# Patient Record
Sex: MALE | Race: OTHER | Employment: STUDENT | URBAN - METROPOLITAN AREA
[De-identification: names, ages, dates, MRNs, and addresses within clinical notes are randomized per-mention and may not be internally consistent; named-entity substitution may affect disease eponyms.]

---

## 2024-05-03 ENCOUNTER — HOSPITAL ENCOUNTER (INPATIENT)
Facility: HOSPITAL | Age: 24
LOS: 1 days | Discharge: HOME | DRG: 866 | End: 2024-05-05
Attending: EMERGENCY MEDICINE | Admitting: INTERNAL MEDICINE
Payer: COMMERCIAL

## 2024-05-03 DIAGNOSIS — B27.90 MONONUCLEOSIS SYNDROME: ICD-10-CM

## 2024-05-03 DIAGNOSIS — E80.6 HYPERBILIRUBINEMIA: ICD-10-CM

## 2024-05-03 DIAGNOSIS — R21 RASH: ICD-10-CM

## 2024-05-03 DIAGNOSIS — R74.01 TRANSAMINITIS: Primary | ICD-10-CM

## 2024-05-03 DIAGNOSIS — R16.0 LIVER MASS: ICD-10-CM

## 2024-05-03 PROBLEM — R65.10 SIRS (SYSTEMIC INFLAMMATORY RESPONSE SYNDROME) (HCC): Status: ACTIVE | Noted: 2024-05-03

## 2024-05-03 PROBLEM — R79.89 ELEVATED LFTS: Status: ACTIVE | Noted: 2024-05-03

## 2024-05-03 LAB
AFP-TM SERPL-MCNC: 2.02 NG/ML (ref 0–9)
ALBUMIN SERPL BCP-MCNC: 3.9 G/DL (ref 3.5–5)
ALP SERPL-CCNC: 476 U/L (ref 34–104)
ALT SERPL W P-5'-P-CCNC: 389 U/L (ref 7–52)
ANION GAP SERPL CALCULATED.3IONS-SCNC: 7 MMOL/L (ref 4–13)
APTT PPP: 34 SECONDS (ref 23–37)
AST SERPL W P-5'-P-CCNC: 218 U/L (ref 13–39)
BASOPHILS # BLD MANUAL: 0 THOUSAND/UL (ref 0–0.1)
BASOPHILS NFR MAR MANUAL: 0 % (ref 0–1)
BILIRUB SERPL-MCNC: 4.09 MG/DL (ref 0.2–1)
BUN SERPL-MCNC: 14 MG/DL (ref 5–25)
CALCIUM SERPL-MCNC: 9.1 MG/DL (ref 8.4–10.2)
CHLORIDE SERPL-SCNC: 98 MMOL/L (ref 96–108)
CO2 SERPL-SCNC: 28 MMOL/L (ref 21–32)
CREAT SERPL-MCNC: 0.84 MG/DL (ref 0.6–1.3)
EOSINOPHIL # BLD MANUAL: 0.25 THOUSAND/UL (ref 0–0.4)
EOSINOPHIL NFR BLD MANUAL: 2 % (ref 0–6)
ERYTHROCYTE [DISTWIDTH] IN BLOOD BY AUTOMATED COUNT: 14.9 % (ref 11.6–15.1)
GFR SERPL CREATININE-BSD FRML MDRD: 125 ML/MIN/1.73SQ M
GLUCOSE SERPL-MCNC: 87 MG/DL (ref 65–140)
HCT VFR BLD AUTO: 46.8 % (ref 36.5–49.3)
HGB BLD-MCNC: 15.5 G/DL (ref 12–17)
INR PPP: 1.06 (ref 0.84–1.19)
LYMPHOCYTES # BLD AUTO: 71 % (ref 14–44)
LYMPHOCYTES # BLD AUTO: 9.53 THOUSAND/UL (ref 0.6–4.47)
MCH RBC QN AUTO: 28.6 PG (ref 26.8–34.3)
MCHC RBC AUTO-ENTMCNC: 33.1 G/DL (ref 31.4–37.4)
MCV RBC AUTO: 86 FL (ref 82–98)
MONOCYTES # BLD AUTO: 0.51 THOUSAND/UL (ref 0–1.22)
MONOCYTES NFR BLD: 4 % (ref 4–12)
NEUTROPHILS # BLD MANUAL: 2.41 THOUSAND/UL (ref 1.85–7.62)
NEUTS SEG NFR BLD AUTO: 19 % (ref 43–75)
PLATELET # BLD AUTO: 211 THOUSANDS/UL (ref 149–390)
PLATELET BLD QL SMEAR: ADEQUATE
PMV BLD AUTO: 10.7 FL (ref 8.9–12.7)
POTASSIUM SERPL-SCNC: 4.3 MMOL/L (ref 3.5–5.3)
PROT SERPL-MCNC: 8 G/DL (ref 6.4–8.4)
PROTHROMBIN TIME: 14 SECONDS (ref 11.6–14.5)
RBC # BLD AUTO: 5.42 MILLION/UL (ref 3.88–5.62)
RBC MORPH BLD: NORMAL
SODIUM SERPL-SCNC: 133 MMOL/L (ref 135–147)
VARIANT LYMPHS # BLD AUTO: 4 %
WBC # BLD AUTO: 12.7 THOUSAND/UL (ref 4.31–10.16)

## 2024-05-03 PROCEDURE — 87340 HEPATITIS B SURFACE AG IA: CPT | Performed by: STUDENT IN AN ORGANIZED HEALTH CARE EDUCATION/TRAINING PROGRAM

## 2024-05-03 PROCEDURE — 87040 BLOOD CULTURE FOR BACTERIA: CPT | Performed by: STUDENT IN AN ORGANIZED HEALTH CARE EDUCATION/TRAINING PROGRAM

## 2024-05-03 PROCEDURE — 86665 EPSTEIN-BARR CAPSID VCA: CPT | Performed by: EMERGENCY MEDICINE

## 2024-05-03 PROCEDURE — 85610 PROTHROMBIN TIME: CPT | Performed by: EMERGENCY MEDICINE

## 2024-05-03 PROCEDURE — 99223 1ST HOSP IP/OBS HIGH 75: CPT | Performed by: STUDENT IN AN ORGANIZED HEALTH CARE EDUCATION/TRAINING PROGRAM

## 2024-05-03 PROCEDURE — 82378 CARCINOEMBRYONIC ANTIGEN: CPT | Performed by: STUDENT IN AN ORGANIZED HEALTH CARE EDUCATION/TRAINING PROGRAM

## 2024-05-03 PROCEDURE — 86704 HEP B CORE ANTIBODY TOTAL: CPT | Performed by: STUDENT IN AN ORGANIZED HEALTH CARE EDUCATION/TRAINING PROGRAM

## 2024-05-03 PROCEDURE — 85027 COMPLETE CBC AUTOMATED: CPT | Performed by: EMERGENCY MEDICINE

## 2024-05-03 PROCEDURE — 85730 THROMBOPLASTIN TIME PARTIAL: CPT | Performed by: EMERGENCY MEDICINE

## 2024-05-03 PROCEDURE — 80074 ACUTE HEPATITIS PANEL: CPT | Performed by: EMERGENCY MEDICINE

## 2024-05-03 PROCEDURE — 85007 BL SMEAR W/DIFF WBC COUNT: CPT | Performed by: EMERGENCY MEDICINE

## 2024-05-03 PROCEDURE — 82105 ALPHA-FETOPROTEIN SERUM: CPT | Performed by: STUDENT IN AN ORGANIZED HEALTH CARE EDUCATION/TRAINING PROGRAM

## 2024-05-03 PROCEDURE — 86664 EPSTEIN-BARR NUCLEAR ANTIGEN: CPT | Performed by: EMERGENCY MEDICINE

## 2024-05-03 PROCEDURE — 86704 HEP B CORE ANTIBODY TOTAL: CPT | Performed by: EMERGENCY MEDICINE

## 2024-05-03 PROCEDURE — 96374 THER/PROPH/DIAG INJ IV PUSH: CPT

## 2024-05-03 PROCEDURE — 86663 EPSTEIN-BARR ANTIBODY: CPT | Performed by: EMERGENCY MEDICINE

## 2024-05-03 PROCEDURE — 80053 COMPREHEN METABOLIC PANEL: CPT | Performed by: EMERGENCY MEDICINE

## 2024-05-03 PROCEDURE — 99285 EMERGENCY DEPT VISIT HI MDM: CPT | Performed by: EMERGENCY MEDICINE

## 2024-05-03 PROCEDURE — 86705 HEP B CORE ANTIBODY IGM: CPT | Performed by: STUDENT IN AN ORGANIZED HEALTH CARE EDUCATION/TRAINING PROGRAM

## 2024-05-03 PROCEDURE — 86803 HEPATITIS C AB TEST: CPT | Performed by: STUDENT IN AN ORGANIZED HEALTH CARE EDUCATION/TRAINING PROGRAM

## 2024-05-03 PROCEDURE — 99283 EMERGENCY DEPT VISIT LOW MDM: CPT

## 2024-05-03 PROCEDURE — 36415 COLL VENOUS BLD VENIPUNCTURE: CPT | Performed by: EMERGENCY MEDICINE

## 2024-05-03 RX ORDER — DIPHENHYDRAMINE HYDROCHLORIDE, ZINC ACETATE 2; .1 G/100G; G/100G
CREAM TOPICAL 3 TIMES DAILY PRN
Status: DISCONTINUED | OUTPATIENT
Start: 2024-05-03 | End: 2024-05-04

## 2024-05-03 RX ORDER — DIPHENHYDRAMINE HYDROCHLORIDE 50 MG/ML
25 INJECTION INTRAMUSCULAR; INTRAVENOUS ONCE
Status: COMPLETED | OUTPATIENT
Start: 2024-05-03 | End: 2024-05-03

## 2024-05-03 RX ORDER — DIPHENHYDRAMINE HCL 25 MG
25 TABLET ORAL EVERY 6 HOURS PRN
Status: DISCONTINUED | OUTPATIENT
Start: 2024-05-03 | End: 2024-05-04

## 2024-05-03 RX ORDER — ONDANSETRON 2 MG/ML
4 INJECTION INTRAMUSCULAR; INTRAVENOUS EVERY 6 HOURS PRN
Status: DISCONTINUED | OUTPATIENT
Start: 2024-05-03 | End: 2024-05-05 | Stop reason: HOSPADM

## 2024-05-03 RX ORDER — NAPROXEN 250 MG/1
250 TABLET ORAL 3 TIMES DAILY PRN
Status: DISCONTINUED | OUTPATIENT
Start: 2024-05-03 | End: 2024-05-04

## 2024-05-03 RX ADMIN — DIPHENHYDRAMINE HYDROCHLORIDE 25 MG: 25 TABLET ORAL at 21:22

## 2024-05-03 RX ADMIN — NAPROXEN 250 MG: 250 TABLET ORAL at 21:20

## 2024-05-03 RX ADMIN — DIPHENHYDRAMINE HYDROCHLORIDE 25 MG: 50 INJECTION, SOLUTION INTRAMUSCULAR; INTRAVENOUS at 16:33

## 2024-05-03 RX ADMIN — DIPHENHYDRAMINE HYDROCHLORIDE, ZINC ACETATE: 2; .1 CREAM TOPICAL at 20:34

## 2024-05-03 NOTE — ED PROVIDER NOTES
History  Chief Complaint   Patient presents with    Rash     Patient reports was seen at [facility] recently and diagnosed with Mono, while there they found a mass on his liver. Patient reports this morning he noticed a rash on his right shoulder that is now covering his entire body. Denies any new detergents, soaps, foods, or exposures. Denies SOB.     22 yo M referred to ED from , where he was being seen in followup for transaminitis, hyperbilirubinemia, first noted [facility] ED 4/30/24 where he was being evaluated for 2 weeks of fatigue, sore throat, intermittent fever, onset of jaundice diagnosed with mononucleosis.  He was found to have a 4.6 cm solid mass in the left lobe of the liver for which the differential includes benign lesions such as well as malignancy, and for which liver mass protocol MRI for further evaluation was recommended.  Since then, jaundice has dissipated somewhat, sore throat is waxing/waning, and then today he developed a diffuse papular rash from head to feet including palms and soles.  He was referred to the ED with the goal to admit him for further evaluation of the liver.  He did not receive any amoxicillin.      Lyme, Strep, COVID, Flu, RSV, Hepatitis panel were negative at the previous hospital.        History provided by:  Patient      Prior to Admission Medications   Prescriptions Last Dose Informant Patient Reported? Taking?   Acetaminophen (TYLENOL PO)   Yes Yes   Sig: Take by mouth   Ibuprofen (MOTRIN PO)   Yes Yes   Sig: Take by mouth      Facility-Administered Medications: None       History reviewed. No pertinent past medical history.    History reviewed. No pertinent surgical history.    Family History   Problem Relation Age of Onset    Thyroid disease Mother     Migraines Mother     Cancer Father         Bladder Cancer    Colon polyps Father     Lung cancer Maternal Grandmother     Lung cancer Paternal Grandmother     Brain cancer Maternal Aunt      I have reviewed and agree with  the history as documented.    E-Cigarette/Vaping    E-Cigarette Use Never User      E-Cigarette/Vaping Substances    Nicotine No     THC No     CBD No     Flavoring No     Other No     Unknown No      Social History     Tobacco Use    Smoking status: Never    Smokeless tobacco: Never   Vaping Use    Vaping status: Never Used   Substance Use Topics    Alcohol use: Yes     Alcohol/week: 1.0 - 2.0 standard drink of alcohol     Types: 1 - 2 Standard drinks or equivalent per week    Drug use: Never       Review of Systems    Physical Exam  Physical Exam  Vitals and nursing note reviewed.   Constitutional:       Appearance: He is well-developed.   HENT:      Head: Normocephalic and atraumatic.      Right Ear: External ear normal.      Left Ear: External ear normal.      Nose: Nose normal.      Mouth/Throat:      Mouth: Mucous membranes are moist.      Pharynx: Oropharyngeal exudate present.      Tonsils: Tonsillar exudate present.   Eyes:      Conjunctiva/sclera: Conjunctivae normal.      Pupils: Pupils are equal, round, and reactive to light.   Cardiovascular:      Rate and Rhythm: Normal rate.   Pulmonary:      Effort: Pulmonary effort is normal.   Abdominal:      Tenderness: There is no abdominal tenderness.   Musculoskeletal:         General: Normal range of motion.      Cervical back: Normal range of motion and neck supple.   Skin:     General: Skin is warm and dry.      Capillary Refill: Capillary refill takes less than 2 seconds.      Coloration: Skin is jaundiced (slight scleral icterus).      Findings: Rash (diffuse morbilliform, including palms and soles) present.   Neurological:      Mental Status: He is alert and oriented to person, place, and time.      Cranial Nerves: No cranial nerve deficit.      Coordination: Coordination normal.   Psychiatric:         Behavior: Behavior normal.         Thought Content: Thought content normal.         Judgment: Judgment normal.         Vital Signs  ED Triage Vitals  [05/03/24 1517]   Temperature Pulse Respirations Blood Pressure SpO2   99.1 °F (37.3 °C) 88 16 142/79 98 %      Temp Source Heart Rate Source Patient Position - Orthostatic VS BP Location FiO2 (%)   Oral Monitor Sitting Right arm --      Pain Score       No Pain           Vitals:    05/03/24 1517 05/03/24 1726   BP: 142/79 117/63   Pulse: 88 86   Patient Position - Orthostatic VS: Sitting Lying         Visual Acuity      ED Medications  Medications   diphenhydrAMINE (BENADRYL) injection 25 mg (25 mg Intravenous Given 5/3/24 1633)       Diagnostic Studies  Results Reviewed       Procedure Component Value Units Date/Time    Manual Differential(PHLEBS Do Not Order) [[id number]]  (Abnormal) Collected: 05/03/24 1622    Lab Status: Final result Specimen: Blood from Arm, Left Updated: 05/03/24 1711     Segmented % 19 %      Lymphocytes % 71 %      Monocytes % 4 %      Eosinophils % 2 %      Basophils % 0 %      Atypical Lymphocytes % 4 %      Absolute Neutrophils 2.41 Thousand/uL      Absolute Lymphocytes 9.53 Thousand/uL      Absolute Monocytes 0.51 Thousand/uL      Absolute Eosinophils 0.25 Thousand/uL      Absolute Basophils 0.00 Thousand/uL      Total Counted --     RBC Morphology Normal     Platelet Estimate Adequate    Comprehensive metabolic panel [[id number]]  (Abnormal) Collected: 05/03/24 1622    Lab Status: Final result Specimen: Blood from Arm, Left Updated: 05/03/24 1648     Sodium 133 mmol/L      Potassium 4.3 mmol/L      Chloride 98 mmol/L      CO2 28 mmol/L      ANION GAP 7 mmol/L      BUN 14 mg/dL      Creatinine 0.84 mg/dL      Glucose 87 mg/dL      Calcium 9.1 mg/dL       U/L       U/L      Alkaline Phosphatase 476 U/L      Total Protein 8.0 g/dL      Albumin 3.9 g/dL      Total Bilirubin 4.09 mg/dL      eGFR 125 ml/min/1.73sq m     Narrative:      National Kidney Disease Foundation guidelines for Chronic Kidney Disease (CKD):     Stage 1 with normal or high GFR (GFR > 90 mL/min/1.73 square  meters)    Stage 2 Mild CKD (GFR = 60-89 mL/min/1.73 square meters)    Stage 3A Moderate CKD (GFR = 45-59 mL/min/1.73 square meters)    Stage 3B Moderate CKD (GFR = 30-44 mL/min/1.73 square meters)    Stage 4 Severe CKD (GFR = 15-29 mL/min/1.73 square meters)    Stage 5 End Stage CKD (GFR <15 mL/min/1.73 square meters)  Note: GFR calculation is accurate only with a steady state creatinine    Protime-INR [[id number]]  (Normal) Collected: 05/03/24 1622    Lab Status: Final result Specimen: Blood from Arm, Left Updated: 05/03/24 1647     Protime 14.0 seconds      INR 1.06    APTT [[id number]]  (Normal) Collected: 05/03/24 1622    Lab Status: Final result Specimen: Blood from Arm, Left Updated: 05/03/24 1647     PTT 34 seconds     CBC and differential [[id number]]  (Abnormal) Collected: 05/03/24 1622    Lab Status: Final result Specimen: Blood from Arm, Left Updated: 05/03/24 1632     WBC 12.70 Thousand/uL      RBC 5.42 Million/uL      Hemoglobin 15.5 g/dL      Hematocrit 46.8 %      MCV 86 fL      MCH 28.6 pg      MCHC 33.1 g/dL      RDW 14.9 %      MPV 10.7 fL      Platelets 211 Thousands/uL     Narrative:      This is an appended report.  These results have been appended to a previously verified report.    EBV acute panel [[id number]] Collected: 05/03/24 1622    Lab Status: In process Specimen: Blood from Arm, Left Updated: 05/03/24 1626    Hepatitis panel, acute [[id number]] Collected: 05/03/24 1622    Lab Status: In process Specimen: Blood from Arm, Left Updated: 05/03/24 1625                   No orders to display              Procedures  Procedures         ED Course  ED Course as of 05/03/24 1738   Fri May 03, 2024   1632 WBC(!): 12.70  Mildly elevated, stable compared to previous   1632 Symptoms and sequelae consistent with diagnosis of EBV.  I reviewed the notes from GI office today, and received a forwarded message from Venita Deng PA-C to Dr. Massey to our charge nurse stating they want the patient admitted for  further evaluation.  I ordered of the labwork relevant to the ED, including hepatitis panel, and EBV.     1649 POCT INR: 1.06  normal   1650 Total Bilirubin(!): 4.09  Improved from 4/30   1650 Transaminitis stable to improved   1716 With improved LFTs, I spoke with Dr. Massey came to ED and I reviewed the case.  He agreed the values are improving.  I asked for consultation, and he said to admit him to observation on SLIM overnight and consult can be done tomorrow.                                               Medical Decision Making  Amount and/or Complexity of Data Reviewed  Labs: ordered. Decision-making details documented in ED Course.    Risk  Prescription drug management.  Decision regarding hospitalization.             Disposition  Final diagnoses:   Transaminitis   Hyperbilirubinemia   Mononucleosis syndrome   Liver mass     Time reflects when diagnosis was documented in both MDM as applicable and the Disposition within this note       Time User Action Codes Description Comment    5/3/2024  5:19 PM Leander Sanders [R74.01] Transaminitis     5/3/2024  5:20 PM Leander Sanders [E80.6] Hyperbilirubinemia     5/3/2024  5:20 PM Leander Sanders Add [B27.90] Mononucleosis syndrome     5/3/2024  5:36 PM Leander Sanders Add [R16.0] Liver mass           ED Disposition       ED Disposition   Admit    Condition   Stable    Date/Time   Fri May 3, 2024  5:35 PM    Comment   Case was discussed with Dr. Dillon and the patient's admission status was agreed to be Admission Status: observation status to the service of Dr. Dillon .               Follow-up Information    None         Patient's Medications   Discharge Prescriptions    No medications on file       No discharge procedures on file.    PDMP Review       None            ED Provider  Electronically Signed by             Leander Sanders MD  05/03/24 1738       Leander Sanders MD  05/03/24 1739

## 2024-05-03 NOTE — ASSESSMENT & PLAN NOTE
Diffused macular papular rash involving neck, hands, chest, back, abdomen and legs  Unclear of current etiology at this time  Possibly related to recent infectious mononucleosis, no recent antibiotics usage  Supportive care

## 2024-05-03 NOTE — ASSESSMENT & PLAN NOTE
Did not meet SIRS criteria with tachycardia, white count  Empirically check blood cultures  Suspect likely due to recently diagnosed infectious mononucleosis  ID consult

## 2024-05-03 NOTE — PLAN OF CARE
Problem: PAIN - ADULT  Goal: Verbalizes/displays adequate comfort level or baseline comfort level  Description: Interventions:  - Encourage patient to monitor pain and request assistance  - Assess pain using appropriate pain scale  - Administer analgesics based on type and severity of pain and evaluate response  - Implement non-pharmacological measures as appropriate and evaluate response  - Consider cultural and social influences on pain and pain management  - Notify physician/advanced practitioner if interventions unsuccessful or patient reports new pain  Outcome: Progressing     Problem: INFECTION - ADULT  Goal: Absence or prevention of progression during hospitalization  Description: INTERVENTIONS:  - Assess and monitor for signs and symptoms of infection  - Monitor lab/diagnostic results  - Monitor all insertion sites, i.e. indwelling lines, tubes, and drains  - Monitor endotracheal if appropriate and nasal secretions for changes in amount and color  - [place] appropriate cooling/warming therapies per order  - Administer medications as ordered  - Instruct and encourage patient and family to use good hand hygiene technique  - Identify and instruct in appropriate isolation precautions for identified infection/condition  Outcome: Progressing  Goal: Absence of fever/infection during neutropenic period  Description: INTERVENTIONS:  - Monitor WBC    Outcome: Progressing     Problem: SAFETY ADULT  Goal: Patient will remain free of falls  Description: INTERVENTIONS:  - Educate patient/family on patient safety including physical limitations  - Instruct patient to call for assistance with activity   - Consult OT/PT to assist with strengthening/mobility   - Keep Call bell within reach  - Keep bed low and locked with side rails adjusted as appropriate  - Keep care items and personal belongings within reach  - Initiate and maintain comfort rounds  - Make Fall Risk Sign visible to staff  - Obtain necessary fall risk  management equipment  - Apply yellow socks and bracelet for high fall risk patients  - Consider moving patient to room near nurses station  Outcome: Progressing  Goal: Maintain or return to baseline ADL function  Description: INTERVENTIONS:  -  Assess patient's ability to carry out ADLs; assess patient's baseline for ADL function and identify physical deficits which impact ability to perform ADLs (bathing, care of mouth/teeth, toileting, grooming, dressing, etc.)  - Assess/evaluate cause of self-care deficits   - Assess range of motion  - Assess patient's mobility; develop plan if impaired  - Assess patient's need for assistive devices and provide as appropriate  - Encourage maximum independence but intervene and supervise when necessary  - Involve family in performance of ADLs  - Assess for home care needs following discharge   - Consider OT consult to assist with ADL evaluation and planning for discharge  - Provide patient education as appropriate  Outcome: Progressing  Goal: Maintains/Returns to pre admission functional level  Description: INTERVENTIONS:  - Perform AM-PAC 6 Click Basic Mobility/ Daily Activity assessment daily.  - Set and communicate daily mobility goal to care team and patient/family/caregiver.   - Collaborate with rehabilitation services on mobility goals if consulted  - Perform Range of Motion 3 times a day.  - Reposition patient every 2 hours.  - Dangle patient 3 times a day  - Stand patient 3 times a day  - Ambulate patient 3 times a day  - Out of bed to chair 3 times a day   - Out of bed for meals 3 times a day  - Out of bed for toileting  - Record patient progress and toleration of activity level   Outcome: Progressing     Problem: DISCHARGE PLANNING  Goal: Discharge to home or other facility with appropriate resources  Description: INTERVENTIONS:  - Identify barriers to discharge w/patient and caregiver  - Arrange for needed discharge resources and transportation as appropriate  -  Identify discharge learning needs (meds, wound care, etc.)  - Arrange for interpretive services to assist at discharge as needed  - Refer to Case Management Department for coordinating discharge planning if the patient needs post-hospital services based on physician/advanced practitioner order or complex needs related to functional status, cognitive ability, or social support system  Outcome: Progressing     Problem: Knowledge Deficit  Goal: Patient/family/caregiver demonstrates understanding of disease process, treatment plan, medications, and discharge instructions  Description: Complete learning assessment and assess knowledge base.  Interventions:  - Provide teaching at level of understanding  - Provide teaching via preferred learning methods  Outcome: Progressing     Problem: METABOLIC, FLUID AND ELECTROLYTES - ADULT  Goal: Electrolytes maintained within normal limits  Description: INTERVENTIONS:  - Monitor labs and assess patient for signs and symptoms of electrolyte imbalances  - Administer electrolyte replacement as ordered  - Monitor response to electrolyte replacements, including repeat lab results as appropriate  - Instruct patient on fluid and nutrition as appropriate  Outcome: Progressing  Goal: Fluid balance maintained  Description: INTERVENTIONS:  - Monitor labs   - Monitor I/O and WT  - Instruct patient on fluid and nutrition as appropriate  - Assess for signs & symptoms of volume excess or deficit  Outcome: Progressing     Problem: SKIN/TISSUE INTEGRITY - ADULT  Goal: Skin Integrity remains intact(Skin Breakdown Prevention)  Description: Assess:  -Perform Oracio assessment  -Clean and moisturize skin  -Inspect skin when repositioning, toileting, and assisting with ADLS  -Assess under medical devices  -Assess extremities for adequate circulation and sensation     Bed Management:  -Have minimal linens on bed & keep smooth, unwrinkled  -Change linens as needed when moist or perspiring  -Avoid sitting  or lying in one position for more than 2 hours while in bed  -Keep HOB at 30 degrees     Toileting:  -Offer bedside commode  -Assess for incontinence  -Use incontinent care products after each incontinent episode    Activity:  -Mobilize patient 3 times a day  -Encourage activity and walks on unit  -Encourage or provide ROM exercises   -Turn and reposition patient every 2 Hours  -Use appropriate equipment to lift or move patient in bed  -Instruct/ Assist with weight shifting when out of bed in chair  -Consider limitation of chair time 2 hour intervals    Skin Care:  -Avoid use of baby powder, tape, friction and shearing, hot water or constrictive clothing  -Relieve pressure over bony prominences  -Do not massage red bony areas    Next Steps:  -Teach patient strategies to minimize risks   -Consider consults to  interdisciplinary teams  Outcome: Progressing

## 2024-05-03 NOTE — H&P
[facility]  H&P  Name: Jhonatan Whitney 23 y.o. male I MRN: [id number]  Unit/Bed#: [address] 204[address]01 I Date of Admission: 5/3/2024   Date of Service: 5/3/2024 I Hospital Day: 0      Assessment/Plan   SIRS (systemic inflammatory response syndrome) (HCC)  Assessment & Plan  Did not meet SIRS criteria with tachycardia, white count  Empirically check blood cultures  Suspect likely due to recently diagnosed infectious mononucleosis  ID consult    Rash  Assessment & Plan  Diffused macular papular rash involving neck, hands, chest, back, abdomen and legs  Unclear of current etiology at this time  Possibly related to recent infectious mononucleosis, no recent antibiotics usage  Supportive care    Elevated LFTs  Assessment & Plan  Elevated AST, ALT and bilirubin.  Slightly decreased from [facility] when last checked on 04/30  Right upper quadrant ultrasound completed on 04/30 without any gallbladder abnormality, possibly related to liver mass versus infectious mononucleosis  Monitor at this time, GI consult to follow  Check hepatitis panel, AFP    * Liver mass, left lobe  Assessment & Plan  Left liver solid mass measuring 4.6 cm  GI outpatient did recommend MRI imaging  Will order MRI while patient is here           VTE Prophylaxis:  none, low risk   / sequential compression device   Code Status: FC  POLST: There is no POLST form on file for this patient (pre-hospital)    Anticipated Length of Stay:  Patient will be admitted on an Observation basis with an anticipated length of stay of  2 midnights.   Justification for Hospital Stay: GI evaluation    Chief Complaint:   Rash    History of Present Illness:    Jhonatan Whitney is a 23 y.o. male who presents with diffuse pruritic rash.  Patient with no past medical history.  Recently had been having increasing fatigue, weakness and jaundice.  He was evaluated at [facility] ED on April 4 where he was diagnosed with mono.  He had elevated  LFTs including bilirubin where the right upper quadrant ultrasound showed 4.6 cm liver mass and he was to follow-up with GI outpatient.  GI did recommend MRI imaging though patient has yet to obtain.  Today he awoken with diffuse pruritic rash involving his hands, legs, chest abdomen, neck hands and soles.  He denies any fevers but does occasionally have night sweats, chills.  Denies any chest pain, shortness breath, nausea or vomiting. No recent abx use.    Review of Systems:    Review of Systems   Constitutional:  Positive for chills.   Skin:  Positive for rash.   All other systems reviewed and are negative.      Past Medical and Surgical History:     History reviewed. No pertinent past medical history.    History reviewed. No pertinent surgical history.    Meds/Allergies:    Prior to Admission medications    Medication Sig Start Date End Date Taking? Authorizing Provider   Acetaminophen (TYLENOL PO) Take by mouth   Yes Historical Provider, MD   Ibuprofen (MOTRIN PO) Take by mouth   Yes Historical Provider, MD     I have reviewed home medications with patient personally.    Allergies: No Known Allergies    Social History:     Marital Status: Single   Occupation: student  Patient Pre-hospital Living Situation: home  Patient Pre-hospital Level of Mobility: ambu  Patient Pre-hospital Diet Restrictions: none  Substance Use History:   Social History     Substance and Sexual Activity   Alcohol Use Yes    Alcohol/week: 1.0 - 2.0 standard drink of alcohol    Types: 1 - 2 Standard drinks or equivalent per week     Social History     Tobacco Use   Smoking Status Never   Smokeless Tobacco Never     Social History     Substance and Sexual Activity   Drug Use Never       Family History:    Family History   Problem Relation Age of Onset    Thyroid disease Mother     Migraines Mother     Cancer Father         Bladder Cancer    Colon polyps Father     Lung cancer Maternal Grandmother     Lung cancer Paternal Grandmother     Brain  cancer Maternal Aunt        Physical Exam:     Vitals:   Blood Pressure: 116/77 (05/03/24 1822)  Pulse: 96 (05/03/24 1822)  Temperature: 99.6 °F (37.6 °C) (05/03/24 1822)  Temp Source: Oral (05/03/24 1517)  Respirations: 17 (05/03/24 1822)  Weight - Scale: 82.6 kg (182 lb 1.6 oz) (05/03/24 1517)  SpO2: 97 % (05/03/24 1822)    Physical Exam  Vitals and nursing note reviewed.   Constitutional:       Appearance: Normal appearance.   HENT:      Head: Normocephalic.   Eyes:      Conjunctiva/sclera: Conjunctivae normal.   Cardiovascular:      Rate and Rhythm: Normal rate.   Pulmonary:      Effort: Pulmonary effort is normal. No respiratory distress.   Abdominal:      General: There is no distension.      Palpations: Abdomen is soft.   Musculoskeletal:         General: No swelling. Normal range of motion.      Right lower leg: No edema.      Left lower leg: No edema.   Skin:     General: Skin is warm and dry.      Comments: Diffuse macular papular rash   Neurological:      General: No focal deficit present.      Mental Status: He is alert. Mental status is at baseline.         Additional Data:     Lab Results: I have personally reviewed pertinent reports.      Results from last 7 days   Lab Units 05/03/24  1622   WBC Thousand/uL 12.70*   HEMOGLOBIN g/dL 15.5   HEMATOCRIT % 46.8   PLATELETS Thousands/uL 211   LYMPHO PCT % 71*   MONO PCT % 4   EOS PCT % 2     Results from last 7 days   Lab Units 05/03/24  1622   SODIUM mmol/L 133*   POTASSIUM mmol/L 4.3   CHLORIDE mmol/L 98   CO2 mmol/L 28   BUN mg/dL 14   CREATININE mg/dL 0.84   ANION GAP mmol/L 7   CALCIUM mg/dL 9.1   ALBUMIN g/dL 3.9   TOTAL BILIRUBIN mg/dL 4.09*   ALK PHOS U/L 476*   ALT U/L 389*   AST U/L 218*   GLUCOSE RANDOM mg/dL 87     Results from last 7 days   Lab Units 05/03/24  1622   INR  1.06                   Imaging: I have personally reviewed pertinent reports.      MRI inpatient order    (Results Pending)       EKG, Pathology, and Other Studies Reviewed on  Admission:   EKG: none    Allscripts / Epic Records Reviewed: Yes     ** Please Note: This note has been constructed using a voice recognition system. **

## 2024-05-03 NOTE — ASSESSMENT & PLAN NOTE
Elevated AST, ALT and bilirubin.  Slightly decreased from [facility] when last checked on 04/30  Right upper quadrant ultrasound completed on 04/30 without any gallbladder abnormality, possibly related to liver mass versus infectious mononucleosis  Monitor at this time, GI consult to follow  Check hepatitis panel, AFP

## 2024-05-03 NOTE — ASSESSMENT & PLAN NOTE
Left liver solid mass measuring 4.6 cm  GI outpatient did recommend MRI imaging  Will order MRI while patient is here

## 2024-05-03 NOTE — LETTER
[place] [address]  [address] [facility] [address]  Dept: [phone]    May 5, 2024     Patient: Jhonatan Whitney   YOB: 2000   Date of Visit: 5/3/2024       To Whom it May Concern:    Jhonatan Whitney is under my professional care. He was seen in the hospital from 5/3/2024 to 05/05/24. He may return to work on 5/13/2024 without limitations.    If you have any questions or concerns, please don't hesitate to call.         Sincerely,          Shan Perry MD

## 2024-05-04 ENCOUNTER — APPOINTMENT (OUTPATIENT)
Dept: MRI IMAGING | Facility: HOSPITAL | Age: 24
DRG: 866 | End: 2024-05-04
Payer: COMMERCIAL

## 2024-05-04 LAB
ALBUMIN SERPL BCP-MCNC: 3.6 G/DL (ref 3.5–5)
ALP SERPL-CCNC: 455 U/L (ref 34–104)
ALT SERPL W P-5'-P-CCNC: 345 U/L (ref 7–52)
ANION GAP SERPL CALCULATED.3IONS-SCNC: 6 MMOL/L (ref 4–13)
AST SERPL W P-5'-P-CCNC: 186 U/L (ref 13–39)
BASOPHILS # BLD MANUAL: 0 THOUSAND/UL (ref 0–0.1)
BASOPHILS NFR MAR MANUAL: 0 % (ref 0–1)
BILIRUB SERPL-MCNC: 3.5 MG/DL (ref 0.2–1)
BUN SERPL-MCNC: 14 MG/DL (ref 5–25)
CALCIUM SERPL-MCNC: 9 MG/DL (ref 8.4–10.2)
CEA SERPL-MCNC: 1 NG/ML (ref 0–3)
CHLORIDE SERPL-SCNC: 100 MMOL/L (ref 96–108)
CO2 SERPL-SCNC: 28 MMOL/L (ref 21–32)
CREAT SERPL-MCNC: 0.84 MG/DL (ref 0.6–1.3)
EBV NA IGG SER IA-ACNC: <18 U/ML (ref 0–17.9)
EBV VCA IGG SER IA-ACNC: 84.6 U/ML (ref 0–17.9)
EBV VCA IGM SER IA-ACNC: >160 U/ML (ref 0–35.9)
EOSINOPHIL # BLD MANUAL: 0.12 THOUSAND/UL (ref 0–0.4)
EOSINOPHIL NFR BLD MANUAL: 1 % (ref 0–6)
ERYTHROCYTE [DISTWIDTH] IN BLOOD BY AUTOMATED COUNT: 14.9 % (ref 11.6–15.1)
FERRITIN SERPL-MCNC: 331 NG/ML (ref 24–336)
GFR SERPL CREATININE-BSD FRML MDRD: 125 ML/MIN/1.73SQ M
GLUCOSE P FAST SERPL-MCNC: 84 MG/DL (ref 65–99)
GLUCOSE SERPL-MCNC: 84 MG/DL (ref 65–140)
HAV IGM SER QL: ABNORMAL
HBV CORE AB SER QL: ABNORMAL
HBV CORE AB SER QL: NORMAL
HBV CORE IGM SER QL: REACTIVE
HBV CORE IGM SER QL: REACTIVE
HBV SURFACE AG SER QL: ABNORMAL
HBV SURFACE AG SER QL: ABNORMAL
HCT VFR BLD AUTO: 45.4 % (ref 36.5–49.3)
HCV AB SER QL: ABNORMAL
HCV AB SER QL: ABNORMAL
HGB BLD-MCNC: 15.1 G/DL (ref 12–17)
HIV 1+2 AB+HIV1 P24 AG SERPL QL IA: NORMAL
HIV1 P24 AG SER QL: NORMAL
INR PPP: 1.09 (ref 0.84–1.19)
INTERPRETATION: ABNORMAL
IRON SATN MFR SERPL: 29 % (ref 15–50)
IRON SERPL-MCNC: 102 UG/DL (ref 50–212)
LYMPHOCYTES # BLD AUTO: 10.56 THOUSAND/UL (ref 0.6–4.47)
LYMPHOCYTES # BLD AUTO: 81 % (ref 14–44)
MAGNESIUM SERPL-MCNC: 2.3 MG/DL (ref 1.9–2.7)
MCH RBC QN AUTO: 28.4 PG (ref 26.8–34.3)
MCHC RBC AUTO-ENTMCNC: 33.3 G/DL (ref 31.4–37.4)
MCV RBC AUTO: 86 FL (ref 82–98)
MONOCYTES # BLD AUTO: 0.59 THOUSAND/UL (ref 0–1.22)
MONOCYTES NFR BLD: 5 % (ref 4–12)
NEUTROPHILS # BLD MANUAL: 0.59 THOUSAND/UL (ref 1.85–7.62)
NEUTS BAND NFR BLD MANUAL: 1 % (ref 0–8)
NEUTS SEG NFR BLD AUTO: 4 % (ref 43–75)
PLATELET # BLD AUTO: 214 THOUSANDS/UL (ref 149–390)
PLATELET BLD QL SMEAR: ADEQUATE
PMV BLD AUTO: 11.4 FL (ref 8.9–12.7)
POTASSIUM SERPL-SCNC: 4.4 MMOL/L (ref 3.5–5.3)
PROT SERPL-MCNC: 7.5 G/DL (ref 6.4–8.4)
PROTHROMBIN TIME: 14.3 SECONDS (ref 11.6–14.5)
RBC # BLD AUTO: 5.31 MILLION/UL (ref 3.88–5.62)
RBC MORPH BLD: NORMAL
SODIUM SERPL-SCNC: 134 MMOL/L (ref 135–147)
TIBC SERPL-MCNC: 356 UG/DL (ref 250–450)
UIBC SERPL-MCNC: 254 UG/DL (ref 155–355)
VARIANT LYMPHS # BLD AUTO: 8 %
WBC # BLD AUTO: 11.87 THOUSAND/UL (ref 4.31–10.16)

## 2024-05-04 PROCEDURE — 99232 SBSQ HOSP IP/OBS MODERATE 35: CPT | Performed by: INTERNAL MEDICINE

## 2024-05-04 PROCEDURE — 82728 ASSAY OF FERRITIN: CPT | Performed by: STUDENT IN AN ORGANIZED HEALTH CARE EDUCATION/TRAINING PROGRAM

## 2024-05-04 PROCEDURE — 82104 ALPHA-1-ANTITRYPSIN PHENO: CPT | Performed by: STUDENT IN AN ORGANIZED HEALTH CARE EDUCATION/TRAINING PROGRAM

## 2024-05-04 PROCEDURE — 85007 BL SMEAR W/DIFF WBC COUNT: CPT | Performed by: STUDENT IN AN ORGANIZED HEALTH CARE EDUCATION/TRAINING PROGRAM

## 2024-05-04 PROCEDURE — 86038 ANTINUCLEAR ANTIBODIES: CPT | Performed by: STUDENT IN AN ORGANIZED HEALTH CARE EDUCATION/TRAINING PROGRAM

## 2024-05-04 PROCEDURE — 86381 MITOCHONDRIAL ANTIBODY EACH: CPT | Performed by: STUDENT IN AN ORGANIZED HEALTH CARE EDUCATION/TRAINING PROGRAM

## 2024-05-04 PROCEDURE — 83735 ASSAY OF MAGNESIUM: CPT | Performed by: STUDENT IN AN ORGANIZED HEALTH CARE EDUCATION/TRAINING PROGRAM

## 2024-05-04 PROCEDURE — 99244 OFF/OP CNSLTJ NEW/EST MOD 40: CPT | Performed by: INTERNAL MEDICINE

## 2024-05-04 PROCEDURE — 86780 TREPONEMA PALLIDUM: CPT | Performed by: INTERNAL MEDICINE

## 2024-05-04 PROCEDURE — 74183 MRI ABD W/O CNTR FLWD CNTR: CPT

## 2024-05-04 PROCEDURE — 82103 ALPHA-1-ANTITRYPSIN TOTAL: CPT | Performed by: STUDENT IN AN ORGANIZED HEALTH CARE EDUCATION/TRAINING PROGRAM

## 2024-05-04 PROCEDURE — A9585 GADOBUTROL INJECTION: HCPCS | Performed by: INTERNAL MEDICINE

## 2024-05-04 PROCEDURE — 85610 PROTHROMBIN TIME: CPT | Performed by: STUDENT IN AN ORGANIZED HEALTH CARE EDUCATION/TRAINING PROGRAM

## 2024-05-04 PROCEDURE — 82390 ASSAY OF CERULOPLASMIN: CPT | Performed by: STUDENT IN AN ORGANIZED HEALTH CARE EDUCATION/TRAINING PROGRAM

## 2024-05-04 PROCEDURE — 87517 HEPATITIS B DNA QUANT: CPT | Performed by: STUDENT IN AN ORGANIZED HEALTH CARE EDUCATION/TRAINING PROGRAM

## 2024-05-04 PROCEDURE — 99255 IP/OBS CONSLTJ NEW/EST HI 80: CPT | Performed by: INTERNAL MEDICINE

## 2024-05-04 PROCEDURE — 83550 IRON BINDING TEST: CPT | Performed by: STUDENT IN AN ORGANIZED HEALTH CARE EDUCATION/TRAINING PROGRAM

## 2024-05-04 PROCEDURE — 80053 COMPREHEN METABOLIC PANEL: CPT | Performed by: STUDENT IN AN ORGANIZED HEALTH CARE EDUCATION/TRAINING PROGRAM

## 2024-05-04 PROCEDURE — 85027 COMPLETE CBC AUTOMATED: CPT | Performed by: STUDENT IN AN ORGANIZED HEALTH CARE EDUCATION/TRAINING PROGRAM

## 2024-05-04 PROCEDURE — 86015 ACTIN ANTIBODY EACH: CPT | Performed by: STUDENT IN AN ORGANIZED HEALTH CARE EDUCATION/TRAINING PROGRAM

## 2024-05-04 PROCEDURE — 87806 HIV AG W/HIV1&2 ANTB W/OPTIC: CPT | Performed by: STUDENT IN AN ORGANIZED HEALTH CARE EDUCATION/TRAINING PROGRAM

## 2024-05-04 PROCEDURE — 83540 ASSAY OF IRON: CPT | Performed by: STUDENT IN AN ORGANIZED HEALTH CARE EDUCATION/TRAINING PROGRAM

## 2024-05-04 RX ORDER — DIPHENHYDRAMINE HYDROCHLORIDE 50 MG/ML
25 INJECTION INTRAMUSCULAR; INTRAVENOUS ONCE
Status: DISCONTINUED | OUTPATIENT
Start: 2024-05-04 | End: 2024-05-04

## 2024-05-04 RX ORDER — TRIAMCINOLONE ACETONIDE 1 MG/G
CREAM TOPICAL 2 TIMES DAILY
Status: DISCONTINUED | OUTPATIENT
Start: 2024-05-04 | End: 2024-05-04

## 2024-05-04 RX ORDER — DIPHENHYDRAMINE HYDROCHLORIDE 50 MG/ML
25 INJECTION INTRAMUSCULAR; INTRAVENOUS EVERY 6 HOURS
Status: DISCONTINUED | OUTPATIENT
Start: 2024-05-04 | End: 2024-05-04

## 2024-05-04 RX ORDER — TRIAMCINOLONE ACETONIDE 5 MG/G
CREAM TOPICAL 3 TIMES DAILY
Status: DISCONTINUED | OUTPATIENT
Start: 2024-05-04 | End: 2024-05-05 | Stop reason: HOSPADM

## 2024-05-04 RX ORDER — BENZOCAINE/MENTHOL 6 MG-10 MG
LOZENGE MUCOUS MEMBRANE 4 TIMES DAILY PRN
Status: DISCONTINUED | OUTPATIENT
Start: 2024-05-04 | End: 2024-05-04

## 2024-05-04 RX ORDER — DIPHENHYDRAMINE HYDROCHLORIDE 50 MG/ML
25 INJECTION INTRAMUSCULAR; INTRAVENOUS ONCE
Qty: 1 ML | Refills: 0 | Status: COMPLETED | OUTPATIENT
Start: 2024-05-04 | End: 2024-05-04

## 2024-05-04 RX ORDER — GADOBUTROL 604.72 MG/ML
8 INJECTION INTRAVENOUS
Status: COMPLETED | OUTPATIENT
Start: 2024-05-04 | End: 2024-05-04

## 2024-05-04 RX ORDER — DIPHENHYDRAMINE HYDROCHLORIDE 50 MG/ML
50 INJECTION INTRAMUSCULAR; INTRAVENOUS EVERY 6 HOURS
Status: DISCONTINUED | OUTPATIENT
Start: 2024-05-04 | End: 2024-05-05 | Stop reason: HOSPADM

## 2024-05-04 RX ORDER — PREDNISONE 20 MG/1
60 TABLET ORAL DAILY
Status: DISCONTINUED | OUTPATIENT
Start: 2024-05-04 | End: 2024-05-05 | Stop reason: HOSPADM

## 2024-05-04 RX ADMIN — DIPHENHYDRAMINE HYDROCHLORIDE 25 MG: 50 INJECTION, SOLUTION INTRAMUSCULAR; INTRAVENOUS at 05:55

## 2024-05-04 RX ADMIN — DIPHENHYDRAMINE HYDROCHLORIDE 25 MG: 50 INJECTION, SOLUTION INTRAMUSCULAR; INTRAVENOUS at 09:44

## 2024-05-04 RX ADMIN — GADOBUTROL 8 ML: 604.72 INJECTION INTRAVENOUS at 10:42

## 2024-05-04 RX ADMIN — DIPHENHYDRAMINE HYDROCHLORIDE 25 MG: 50 INJECTION, SOLUTION INTRAMUSCULAR; INTRAVENOUS at 00:41

## 2024-05-04 RX ADMIN — TRIAMCINOLONE ACETONIDE: 5 CREAM TOPICAL at 22:23

## 2024-05-04 RX ADMIN — PREDNISONE 60 MG: 20 TABLET ORAL at 14:32

## 2024-05-04 RX ADMIN — DIPHENHYDRAMINE HYDROCHLORIDE 50 MG: 50 INJECTION, SOLUTION INTRAMUSCULAR; INTRAVENOUS at 21:34

## 2024-05-04 RX ADMIN — DIPHENHYDRAMINE HYDROCHLORIDE 50 MG: 50 INJECTION, SOLUTION INTRAMUSCULAR; INTRAVENOUS at 17:06

## 2024-05-04 NOTE — PROGRESS NOTES
"[facility]  Progress Note  Name: Jhonatan Whitney I  MRN: [id number]  Unit/Bed#: [place] [address] -01 I Date of Admission: 5/3/2024   Date of Service: 5/4/2024 I Hospital Day: 0    Assessment/Plan   * Liver mass, left lobe  Assessment & Plan  Confirmed left isoechoic solid mass along the inferior aspect of the left lobe of the liver measuring 4.6 x   3.5 x 4.5 cm   Normal AFP and CEA are reassuring.  Scheduled MRI with liver protocol while in the hospital  GI follow-up.    SIRS (systemic inflammatory response syndrome) (HCC)  Assessment & Plan  With recently confirmed mononucleosis with positive EBV on 4/30/2024  Clinically stable without  hypotension.  Supportive care.  Avoid antibiotics    Elevated LFTs  Assessment & Plan  Likely related to infectious mononucleosis, EBV   MRI of the right liver mass is pending  LFTs are overall trending down  Supportive care    Rash  Assessment & Plan  Diffuse maculopapular rash involving the torso and extremities  Secondary to infectious mononucleosis  Continue Benadryl for itching  Avoid antibiotic           VTE Pharmacologic Prophylaxis: VTE Score: 0 Low Risk (Score 0-2) - Encourage Ambulation.    Patient Centered Rounds: I performed bedside rounds with nursing staff today.   Discussions with Specialists or Other Care Team Provider: H&P and chart reviewed  Education and Discussions with Family / Patient: Updated  (father and mother) at bedside.    Current Length of Stay: 0 day(s)  Current Patient Status: Observation   Certification Statement: The patient will continue to require additional inpatient hospital stay due to rash and liver mass  Discharge Plan: Anticipate discharge in 24-48 hrs to home.    Code Status: Level 1 - Full Code    Subjective:   + itching  + Rash  He has been sick for days.  He feels that he \"peaked\" days ago  regarding fever/malaise.  Its mostly the rash and itching that is bothering him  He is a college " student  + confirmed Mono with EBV on 24 at [facility]    Objective:     Vitals:   Temp (24hrs), Av.3 °F (37.4 °C), Min:98.7 °F (37.1 °C), Max:100.2 °F (37.9 °C)    Temp:  [98.7 °F (37.1 °C)-100.2 °F (37.9 °C)] 98.7 °F (37.1 °C)  HR:  [] 97  Resp:  [16-20] 20  BP: (108-142)/(63-79) 118/71  SpO2:  [96 %-99 %] 96 %  Body mass index is 26.89 kg/m².     Input and Output Summary (last 24 hours):     Intake/Output Summary (Last 24 hours) at 2024 1020  Last data filed at 2024 0615  Gross per 24 hour   Intake 960 ml   Output 0 ml   Net 960 ml       Physical Exam:   Physical Exam  Vitals reviewed.   Constitutional:       Appearance: He is not ill-appearing.   HENT:      Head: Normocephalic and atraumatic.      Nose: No congestion or rhinorrhea.      Mouth/Throat:      Comments: +3 tonsil  + pharyngeal erythema  Cardiovascular:      Rate and Rhythm: Normal rate and regular rhythm.   Pulmonary:      Breath sounds: No stridor. No wheezing, rhonchi or rales.   Abdominal:      General: There is no distension.      Palpations: Abdomen is soft.      Tenderness: There is no abdominal tenderness.   Musculoskeletal:      Cervical back: Neck supple. No rigidity.      Right lower leg: No edema.      Left lower leg: No edema.   Skin:     Findings: Rash present.      Comments: Diffuse macular rash torso>arms>legs   Neurological:      Mental Status: He is oriented to person, place, and time.   Psychiatric:         Mood and Affect: Mood normal.         Behavior: Behavior normal.        Additional Data:     Labs:  Results from last 7 days   Lab Units 24  0612 24  1622   WBC Thousand/uL 11.87* 12.70*   HEMOGLOBIN g/dL 15.1 15.5   HEMATOCRIT % 45.4 46.8   PLATELETS Thousands/uL 214 211   LYMPHO PCT %  --  71*   MONO PCT %  --  4   EOS PCT %  --  2     Results from last 7 days   Lab Units 24  0612   SODIUM mmol/L 134*   POTASSIUM mmol/L 4.4   CHLORIDE mmol/L 100   CO2 mmol/L 28   BUN mg/dL 14   CREATININE  mg/dL 0.84   ANION GAP mmol/L 6   CALCIUM mg/dL 9.0   ALBUMIN g/dL 3.6   TOTAL BILIRUBIN mg/dL 3.50*   ALK PHOS U/L 455*   ALT U/L 345*   AST U/L 186*   GLUCOSE RANDOM mg/dL 84     Results from last 7 days   Lab Units 05/04/24  0612   INR  1.09                   Lines/Drains:  Invasive Devices       Peripheral Intravenous Line  Duration             Peripheral IV 05/03/24 Left Antecubital <1 day                          Imaging: Reviewed radiology reports from this admission including: ultrasound(s)    Recent Cultures (last 7 days):   Results from last 7 days   Lab Units 05/03/24 2050   BLOOD CULTURE  Received in Microbiology Lab. Culture in Progress.  Received in Microbiology Lab. Culture in Progress.       Last 24 Hours Medication List:   Current Facility-Administered Medications   Medication Dose Route Frequency Provider Last Rate    diphenhydrAMINE  50 mg Intravenous Q6H Shan Perry MD      naproxen  250 mg Oral TID PRN Amadou Dillon MD      ondansetron  4 mg Intravenous Q6H PRN Amadou Dillon MD      phenol  1 spray Mouth/Throat Q2H PRN Rosendo Luna PA-C          Today, Patient Was Seen By: Shan Perry MD    **Please Note: This note may have been constructed using a voice recognition system.**

## 2024-05-04 NOTE — CONSULTS
Consultation - Infectious Disease   Jhonatan Whitney 23 y.o. male MRN: [id number]  Unit/Bed#: [place] [address] -01 Encounter: [id number]      IMPRESSION & RECOMMENDATIONS:   1.  Systemic inflammatory response syndrome.  Suspect that the patient has an acute mononucleosis syndrome from a viral process.  Doubt any acute bacterial process.  Fortunately the patient remains hemodynamically stable.  -Monitor off all antibiotics  -Workup as below  -Follow-up blood cultures  -Supportive care    2.  Acute mononucleosis.  With suspected hematologic abnormalities (atypical lymphocytosis), liver function test abnormalities, global fatigue, with hepatomegaly and splenomegaly.  His Monospot was positive in the emergency department at [facility].  Most likely secondary to EBV or CMV.  However the rash is atypical unless this is HIV.  -Check HIV screen  -Follow-up EBV serologies  -Check serum RPR  -No contact sports for the next month  -Recheck CBC with differential and CMP to make sure not worsening    3.  Liver mass.  Left lobe.  Appears to be a benign liver mass based upon the MRI findings as well as normal AFP and CEA.  -GI follow-up    4.  Rash.  Suspect patient may be having a drug reaction to the nonsteroidals.  He has not received any beta-lactam antibiotics..  This can been seen in 10% of cases with EBV related mononucleosis.  Much more common if given a beta-lactam drug.  -Symptomatic treatment  -No ID contraindication to proceed with systemic corticosteroids  -Continue Benadryl  -Consider higher potency topical steroids    Discussed with the primary service the plan to monitor off all antibiotics and provide the supportive care as above and they agree with the plan    Prolonged discussion with the patient's mother, father, and stepfather    Extensive review of the medical records in epic including review of the notes, radiographs, and laboratory results     I have spent a total time of 90 minutes on 05/04/24 in  caring for this patient including Diagnostic results, Prognosis, Risks and benefits of tx options, Instructions for management, Patient and family education, Impressions, Counseling / Coordination of care, Documenting in the medical record, Reviewing / ordering tests, medicine, procedures  , Obtaining or reviewing history  , and Communicating with other healthcare professionals .       HISTORY OF PRESENT ILLNESS:  Reason for Consult: Transaminitis, mononucleosis syndrome, rash  HPI: Jhonatan Whitney is a 23 y.o. year old male without chronic medical abnormalities admitted to [facility] with a rash in the setting of a recent mononucleosis syndrome who were asked to assist with antibiotic management.  The patient had recently developed increasing fatigue and weakness as well as LFT abnormalities and was seen in the emergency department and [facility] emergency department 4/30/2024 and was found to have the LFT abnormalities as well as an atypical lymphocytosis.  He was diagnosed with mononucleosis.  He underwent an ultrasound as an outpatient was found to have a 4.6 cm liver mass and was recommended he undergo MRI.  Yesterday he apparently awoke with a pruritic rash involving his hands, legs, chest, abdomen, neck.  He had been having intermittent night sweats and chills but no documented fever.  Because of the rash the patient came to the emergency department further evaluation.  He was found to have a slight elevation in his temperature but was otherwise stable other than some mild tachycardia.  He had blood cultures obtained and he has been monitored off all antibiotics.  He was noted to have significant liver function test abnormalities as well as a mild leukocytosis with atypical lymphocytes.  The rashes become quite severe and quite pruritic involving his entire torso and extremities including the palms and soles.  He denies receiving any recent antibiotics but has been taking  Motrin.    REVIEW OF SYSTEMS:  A complete review of systems is negative other than that noted in the HPI.    PAST MEDICAL HISTORY:  History reviewed. No pertinent past medical history.  History reviewed. No pertinent surgical history.    FAMILY HISTORY:  Non-contributory    SOCIAL HISTORY:  Social History   Social History     Substance and Sexual Activity   Alcohol Use Yes    Alcohol/week: 1.0 - 2.0 standard drink of alcohol    Types: 1 - 2 Standard drinks or equivalent per week     Social History     Substance and Sexual Activity   Drug Use Never     Social History     Tobacco Use   Smoking Status Never   Smokeless Tobacco Never       ALLERGIES:  No Known Allergies    MEDICATIONS:  All current active medications have been reviewed.  Antibiotics: None    PHYSICAL EXAM:  Temp:  [98.7 °F (37.1 °C)-100.2 °F (37.9 °C)] 98.7 °F (37.1 °C)  HR:  [] 97  Resp:  [16-20] 20  BP: (108-142)/(63-79) 118/71  SpO2:  [96 %-99 %] 96 %  Temp (24hrs), Av.3 °F (37.4 °C), Min:98.7 °F (37.1 °C), Max:100.2 °F (37.9 °C)  Current: Temperature: 98.7 °F (37.1 °C)    Intake/Output Summary (Last 24 hours) at 2024 1228  Last data filed at 2024 1104  Gross per 24 hour   Intake 1560 ml   Output 0 ml   Net 1560 ml       General Appearance:  Appearing well, nontoxic, and in no distress   Head:  Normocephalic, without obvious abnormality, atraumatic   Eyes:  Conjunctiva pink and sclera anicteric, both eyes   Nose: Nares normal, mucosa normal, no drainage   Throat: Oropharynx moist without lesions   Neck: Supple, symmetrical, no adenopathy, no tenderness/mass/nodules   Back:   Symmetric, no curvature, ROM normal, no CVA tenderness   Lungs:   Clear to auscultation bilaterally, respirations unlabored   Chest Wall:  No tenderness or deformity   Heart:  RRR; no murmur, rub or gallop   Abdomen:   Soft, non-tender, non-distended, positive bowel sounds    Extremities: No cyanosis, clubbing or edema   Skin: Diffuse maculopapular eruption  involving the torso and extremities and including the palms.   Lymph nodes: Cervical, supraclavicular nodes normal   Neurologic: Alert and oriented times 3, extremity strength 5/5 and symmetric       LABS, IMAGING, & OTHER STUDIES:  Lab Results:  I have personally reviewed pertinent labs.  Results from last 7 days   Lab Units 05/04/24  0612 05/03/24  1622   WBC Thousand/uL 11.87* 12.70*   HEMOGLOBIN g/dL 15.1 15.5   PLATELETS Thousands/uL 214 211     Results from last 7 days   Lab Units 05/04/24  0612 05/03/24  1622 05/03/24  1622 04/30/24  1201   SODIUM mmol/L 134*  --  133* 135   POTASSIUM mmol/L 4.4   < > 4.3 4.0   CHLORIDE mmol/L 100   < > 98 103   CO2 mmol/L 28   < > 28 24   BUN mg/dL 14   < > 14 7   CREATININE mg/dL 0.84   < > 0.84 0.89   EGFR ml/min/1.73sq m 125   < > 125 125   CALCIUM mg/dL 9.0   < > 9.1 9.2   AST U/L 186*  --  218* 254*   ALT U/L 345*   < > 389* 376*   ALK PHOS U/L 455*   < > 476* 438*    < > = values in this interval not displayed.     Results from last 7 days   Lab Units 05/03/24  2050   BLOOD CULTURE  Received in Microbiology Lab. Culture in Progress.  Received in Microbiology Lab. Culture in Progress.             Results from last 7 days   Lab Units 05/04/24  0612   FERRITIN ng/mL 331           Imaging Studies:     MRI abdomen.  Solid mass consistent with benign lesion.  Hepatomegaly.  Splenomegaly.  No other acute intra-abdominal process.    Images personally reviewed by me in PACS

## 2024-05-04 NOTE — ASSESSMENT & PLAN NOTE
Likely related to infectious mononucleosis, EBV   MRI of the right liver mass is pending  LFTs are overall trending down  Supportive care

## 2024-05-04 NOTE — ASSESSMENT & PLAN NOTE
Diffuse maculopapular rash involving the torso and extremities  Secondary to infectious mononucleosis  Continue Benadryl for itching  Avoid antibiotic

## 2024-05-04 NOTE — ASSESSMENT & PLAN NOTE
Confirmed left isoechoic solid mass along the inferior aspect of the left lobe of the liver measuring 4.6 x   3.5 x 4.5 cm   Normal AFP and CEA are reassuring.  Scheduled MRI with liver protocol while in the hospital  GI follow-up.

## 2024-05-04 NOTE — PLAN OF CARE
Problem: PAIN - ADULT  Goal: Verbalizes/displays adequate comfort level or baseline comfort level  Description: Interventions:  - Encourage patient to monitor pain and request assistance  - Assess pain using appropriate pain scale  - Administer analgesics based on type and severity of pain and evaluate response  - Implement non-pharmacological measures as appropriate and evaluate response  - Consider cultural and social influences on pain and pain management  - Notify physician/advanced practitioner if interventions unsuccessful or patient reports new pain  Outcome: Progressing     Problem: SKIN/TISSUE INTEGRITY - ADULT  Goal: Skin Integrity remains intact(Skin Breakdown Prevention)  Description: Assess:  -Perform Oracio assessment   -Clean and moisturize skin   -Inspect skin when repositioning, toileting, and assisting with ADLS  -Assess under medical devices    -Assess extremities for adequate circulation and sensation     Bed Management:  -Have minimal linens on bed & keep smooth, unwrinkled  -Change linens as needed when moist or perspiring  -Avoid sitting or lying in one position for more than 2 hours while in bed  -Keep HOB at 30 degrees     Toileting:  -Offer bedside commode  -Assess for incontinence   -Use incontinent care products after each incontinent episode    Activity:  -Mobilize patient 3 times a day  -Encourage activity and walks on unit  -Encourage or provide ROM exercises   -Turn and reposition patient every 2 Hours  -Use appropriate equipment to lift or move patient in bed  -Instruct/ Assist with weight shifting every 2 when out of bed in chair  -Consider limitation of chair time 2 hour intervals    Skin Care:  -Avoid use of baby powder, tape, friction and shearing, hot water or constrictive clothing  -Relieve pressure over bony prominences   -Do not massage red bony areas    Next Steps:  -Teach patient strategies to minimize risks    -Consider consults to  interdisciplinary teams   Outcome:  Progressing

## 2024-05-04 NOTE — TREATMENT PLAN
Updated patient and parents re: liver mri results showing findings consistent with benign FNH.   I discussed with the patient and parents re: starting triamcinolone 0.1% BID for the rash and pruritus. They were not satisfied with this and became upset. They demanded something stronger and systemic.  Due to persistent diffuse rash and ongoing severe pruritus despite benadryl and topical hydrocortisone, will start prednisone 60 mg daily until this improves/resolves. Parents and patient have been requesting systemic steroids for him due to his symptoms.

## 2024-05-04 NOTE — CONSULTS
Consultation -  [facility]  Jhonatan Whitney 23 y.o. male MRN: [id number]  Unit/Bed#: [place] [address] -01 Encounter: [id number]            Inpatient consult to gastroenterology     Date/Time  5/4/2024 12:15 PM     Performed by  Fadia Dominguez DO   Authorized by  Leander Sanders MD             Reason for Consult / Principal Problem:     Transaminitis  Hyperbilirubinemia    ASSESSMENT AND PLAN:      23-year-old male without known past medical history, who was recently diagnosed with acute monoinfection complicated by markedly elevated liver enzymes and development of jaundice and acute maculopapular rash prompting evaluation in the ED.  We are asked to evaluate due to elevated liver enzymes and a 4.6 cm liver mass noted on ultrasound imaging at outside hospital.    Patient is nontoxic-appearing but has a diffuse pruritic maculopapular rash on exam with low-grade fever of 100.2 since admission.  His liver enzymes are improving today and I suspect this elevation is likely in the setting of acute monoinfection, however, he does have a 4.6 cm liver mass noted on imaging at outside hospital.  We will obtain complete liver workup as well as MRI with liver protocol for further workup of these elevated liver enzymes and mass noted on ultrasound.  AFP and CEA on admission were normal.  He has no family history of liver cancer.  It is possible that his maculopapular rash is in the setting of his mono infection versus afebrile rash, recommend infectious disease consultation for assistance in management and further investigation.  He is currently taking Benadryl for pruritus from this rash with minimal improvement, discussed short steroid course may be beneficial if okay from an infectious disease standpoint.    Follow-up blood cultures.  Follow-up liver workup and MRI with liver protocol.  Appreciate infectious disease input.  Continue to monitor liver enzymes daily.  Monitor for fevers and  leukocytosis.    Rest of care per primary team.  Thank you for this consultation.   ______________________________________________________________________    HPI: Jhonatan Whitney is a 23-year-old male without known past medical history, whom we are asked to see in consultation for transaminitis with hyperbilirubinemia.    Patient reports sudden onset of fatigue, headaches, and diffuse body aches for 9 days while at college.  He was [facility] by [facility] with recommendation to rule out COVID and influenza.  He reported continuously feeling unwell and went home where he was evaluated at [facility] on 4/30 due to development of dark urine, jaundice, and decreased appetite.  Right upper quadrant ultrasound during this evaluation revealed a 4.6 cm liver mass recommendation for follow-up MRI imaging.  He was then discharged home, however, he reports interval development of a diffuse maculopapular rash with significant pruritus and jaundice prompting repeat evaluation in the ED.  No family history of liver cancer.  His paternal grandmother was diagnosed with colon cancer at a late age.  He denies any alcohol or illicit drug use.  No recent antibiotics.      REVIEW OF SYSTEMS:  10 point ROS reviewed and negative except otherwise noted in the HPI above.     Historical Information   History reviewed. No pertinent past medical history.  History reviewed. No pertinent surgical history.  Social History   Social History     Substance and Sexual Activity   Alcohol Use Yes    Alcohol/week: 1.0 - 2.0 standard drink of alcohol    Types: 1 - 2 Standard drinks or equivalent per week     Social History     Substance and Sexual Activity   Drug Use Never     Social History     Tobacco Use   Smoking Status Never   Smokeless Tobacco Never     Family History   Problem Relation Age of Onset    Thyroid disease Mother     Migraines Mother     Cancer Father         Bladder Cancer    Colon polyps Father     Lung  cancer Maternal Grandmother     Lung cancer Paternal Grandmother     Brain cancer Maternal Aunt        Meds/Allergies     Medications Prior to Admission   Medication    Acetaminophen (TYLENOL PO)    Ibuprofen (MOTRIN PO)     Current Facility-Administered Medications   Medication Dose Route Frequency    diphenhydrAMINE (BENADRYL) injection 50 mg  50 mg Intravenous Q6H    hydrocortisone 1 % cream   Topical 4x Daily PRN    naproxen (NAPROSYN) tablet 250 mg  250 mg Oral TID PRN    ondansetron (ZOFRAN) injection 4 mg  4 mg Intravenous Q6H PRN    phenol (CHLORASEPTIC) 1.4 % mucosal liquid 1 spray  1 spray Mouth/Throat Q2H PRN       No Known Allergies      Objective     Blood pressure 118/71, pulse 97, temperature 98.7 °F (37.1 °C), resp. rate 20, weight 82.6 kg (182 lb 1.6 oz), SpO2 96%. Body mass index is 26.89 kg/m².    PHYSICAL EXAM:    General: acutely ill-appearing, NAD  Eyes: + mild conjunctival icterus  Abdominal: Soft, non-tender, non-distended  Skin: diffuse maculopapular rash   Neuro: alert and oriented  Psych: Normal affect    Lab Results:   Admission on 05/03/2024   Component Date Value    WBC 05/03/2024 12.70 (H)     RBC 05/03/2024 5.42     Hemoglobin 05/03/2024 15.5     Hematocrit 05/03/2024 46.8     MCV 05/03/2024 86     MCH 05/03/2024 28.6     MCHC 05/03/2024 33.1     RDW 05/03/2024 14.9     MPV 05/03/2024 10.7     Platelets 05/03/2024 211     Sodium 05/03/2024 133 (L)     Potassium 05/03/2024 4.3     Chloride 05/03/2024 98     CO2 05/03/2024 28     ANION GAP 05/03/2024 7     BUN 05/03/2024 14     Creatinine 05/03/2024 0.84     Glucose 05/03/2024 87     Calcium 05/03/2024 9.1     AST 05/03/2024 218 (H)     ALT 05/03/2024 389 (H)     Alkaline Phosphatase 05/03/2024 476 (H)     Total Protein 05/03/2024 8.0     Albumin 05/03/2024 3.9     Total Bilirubin 05/03/2024 4.09 (H)     eGFR 05/03/2024 125     Protime 05/03/2024 14.0     INR 05/03/2024 1.06     PTT 05/03/2024 34     Segmented % 05/03/2024 19 (L)      Lymphocytes % 05/03/2024 71 (H)     Monocytes % 05/03/2024 4     Eosinophils % 05/03/2024 2     Basophils % 05/03/2024 0     Atypical Lymphocytes % 05/03/2024 4 (H)     Absolute Neutrophils 05/03/2024 2.41     Absolute Lymphocytes 05/03/2024 9.53 (H)     Absolute Monocytes 05/03/2024 0.51     Absolute Eosinophils 05/03/2024 0.25     Absolute Basophils 05/03/2024 0.00     RBC Morphology 05/03/2024 Normal     Platelet Estimate 05/03/2024 Adequate     Blood Culture 05/03/2024 Received in Microbiology Lab. Culture in Progress.     Blood Culture 05/03/2024 Received in Microbiology Lab. Culture in Progress.     AFP TUMOR MARKER 05/03/2024 2.02     WBC 05/04/2024 11.87 (H)     RBC 05/04/2024 5.31     Hemoglobin 05/04/2024 15.1     Hematocrit 05/04/2024 45.4     MCV 05/04/2024 86     MCH 05/04/2024 28.4     MCHC 05/04/2024 33.3     RDW 05/04/2024 14.9     MPV 05/04/2024 11.4     Platelets 05/04/2024 214     Sodium 05/04/2024 134 (L)     Potassium 05/04/2024 4.4     Chloride 05/04/2024 100     CO2 05/04/2024 28     ANION GAP 05/04/2024 6     BUN 05/04/2024 14     Creatinine 05/04/2024 0.84     Glucose 05/04/2024 84     Glucose, Fasting 05/04/2024 84     Calcium 05/04/2024 9.0     AST 05/04/2024 186 (H)     ALT 05/04/2024 345 (H)     Alkaline Phosphatase 05/04/2024 455 (H)     Total Protein 05/04/2024 7.5     Albumin 05/04/2024 3.6     Total Bilirubin 05/04/2024 3.50 (H)     eGFR 05/04/2024 125     Magnesium 05/04/2024 2.3     Protime 05/04/2024 14.3     INR 05/04/2024 1.09     CEA 05/03/2024 1.0        Imaging Studies: I have personally reviewed pertinent imaging studies.    Fadia Dominguez D.O.  Fellow, PGY-5  Division of Gastroenterology & Hepatology  Available on [facility]  5/4/2024 12:05 PM

## 2024-05-04 NOTE — UTILIZATION REVIEW
Initial Clinical Review    Admission: Date/Time/Statement: OBS 5/3@1736 UPGRADED TO INPT 5/5@1008 D/T SIRS, LIKELY S/T MONONUCLEOSIS WITH ELEVATED LFT'S AND WBC WITH NEED FOR BLD CX, MRI, IV BENADRYL.  Admission Orders (From admission, onward)       Ordered        05/05/24 1008  INPATIENT ADMISSION  Once            05/03/24 1736  Place in Observation  Once                                                             INPATIENT ADMISSION     Standing Status:   Standing     Number of Occurrences:   1     Order Specific Question:   Level of Care     Answer:   Med Surg [16]     Order Specific Question:   Bed Type     Answer:   [place] [4]     Order Specific Question:   Estimated length of stay     Answer:   More than 2 Midnights     Order Specific Question:   Certification     Answer:   I certify that inpatient services are medically necessary for this patient for a duration of greater than two midnights. See H&P and MD Progress Notes for additional information about the patient's course of treatment.     ED Arrival Information       Expected   -    Arrival   5/3/2024 15:04    Acuity   Urgent              Means of arrival   Walk-In    Escorted by   Family Member    Service   Hospitalist    Admission type   Emergency              Arrival complaint   rash             Chief Complaint   Patient presents with    Rash     Patient reports was seen at [facility] recently and diagnosed with Mono, while there they found a mass on his liver. Patient reports this morning he noticed a rash on his right shoulder that is now covering his entire body. Denies any new detergents, soaps, foods, or exposures. Denies SOB.       Initial Presentation: 23 y.o. male to ED from home admitted observation d/t SIRS.Suspect likely due to recently diagnosed infectious mononucleosis.Left liver solid mass measuring 4.6 cm.WBC 12.70.Elevated AST, ALT and bilirubin.  blood cultures.  ID & GI consult.        SIRS (systemic inflammatory response syndrome)  (HCC)  Assessment & Plan  Did not meet SIRS criteria with tachycardia, white count  Empirically check blood cultures  Suspect likely due to recently diagnosed infectious mononucleosis  ID consult     Rash  Assessment & Plan  Diffused macular papular rash involving neck, hands, chest, back, abdomen and legs  Unclear of current etiology at this time  Possibly related to recent infectious mononucleosis, no recent antibiotics usage  Supportive care     Elevated LFTs  Assessment & Plan  Elevated AST, ALT and bilirubin.  Slightly decreased from [facility] when last checked on 04/30  Right upper quadrant ultrasound completed on 04/30 without any gallbladder abnormality, possibly related to liver mass versus infectious mononucleosis  Monitor at this time, GI consult to follow  Check hepatitis panel, AFP     * Liver mass, left lobe  Assessment & Plan  Left liver solid mass measuring 4.6 cm  GI outpatient did recommend MRI imaging  Will order MRI while patient is here    Anticipated Length of Stay/Certification Statement: Patient will be admitted on an Observation basis with an anticipated length of stay of  2 midnights.   Justification for Hospital Stay: GI evaluation     5/4 GI CONSULT:iver mass 4.6 cm on imaging, SIRS criteria positive, acute rash which is diffuse and pruritic, acutely elevated LFTs. No history of IV drug use, alcohol abuse. Non-smoker. No family history of pancreatic or liver cancer. Has leukocytosis. LFTs trending downwards. AFP and CEA are normal. Rash seems to be unrelated to underlying liver mass/suspected cancer. Infectious disease saw patient. Appreciate recommendations. Checking HIV and syphilis. Checking EBV. No contraindication to steroids per ID recommendations.     5/4 ID CONSULT:Systemic inflammatory response syndrome.  Suspect that the patient has an acute mononucleosis syndrome from a viral process.  Doubt any acute bacterial process.  Fortunately the patient remains hemodynamically  stable. Monitor off all antibiotics.BLD CX. Acute mononucleosis.  With suspected hematologic abnormalities (atypical lymphocytosis), liver function test abnormalities, global fatigue, with hepatomegaly and splenomegaly.  His Monospot was positive in the emergency department at [facility].  Most likely secondary to EBV or CMV.  However the rash is atypical unless this is HIV. Liver mass. Left lobe. Appears to be a benign liver mass based upon the MRI findings as well as normal AFP and CEA.  Rash.  Suspect patient may be having a drug reaction to the nonsteroidals.  He has not received any beta-lactam antibiotics..  This can been seen in 10% of cases with EBV related mononucleosis.  Much more common if given a beta-lactam drug.       5/4:+ itching, + Rash.+ Diffuse maculopapular rash involving the torso and extremities.confirmed Mono with EBV on 4/30/24 at [facility].WBC 11.87. MRI of the right liver mass is pending.LFTs are overall trending down.Benadryl.Avoid antibiotics.    5/5: UPGRADED TO INPT. WBC 11.82.Elevated AST, 125, , and bilirubin, 2.56.  Slightly decreased.Improving rash and itching.NO difficulty breathing or swallowing.Awaiting liver mri.+ constipation for 4 days.Benadryl.Avoid antibiotics.    5/6: DAY 2:      ED Triage Vitals [05/03/24 1517]   Temperature Pulse Respirations Blood Pressure SpO2   99.1 °F (37.3 °C) 88 16 142/79 98 %      Temp Source Heart Rate Source Patient Position - Orthostatic VS BP Location FiO2 (%)   Oral Monitor Sitting Right arm --      Pain Score       No Pain          Wt Readings from Last 1 Encounters:   05/03/24 82.6 kg (182 lb 1.6 oz)     Additional Vital Signs:   Date/Time Temp Pulse Resp BP MAP (mmHg) SpO2 O2 Device Patient Position - Orthostatic VS   05/05/24 07:28:39 98.1 °F (36.7 °C) -- 15 110/71 84 -- None (Room air) Lying   05/04/24 2145 97.7 °F (36.5 °C) 89 20 123/67 -- 97 % None (Room air) --   05/04/24 2000 -- -- -- -- -- -- None (Room air) --       05/04/24  09:54:23 98.7 °F (37.1 °C) 97 20 118/71 87 96 % -- --   05/03/24 21:17:04 -- 97 -- 116/77 90 97 % -- --   05/03/24 21:01:30 100.2 °F (37.9 °C) 102 -- -- -- 96 % -- --   05/03/24 2000 -- -- -- -- -- -- None (Room air) --   05/03/24 1830 -- -- -- -- -- -- None (Room air) --   05/03/24 18:22:08 99.6 °F (37.6 °C) 96 17 116/77 90 97 % -- --   05/03/24 1726 -- 86 -- 117/63 81 97 % None (Room air) Lying   05/03/24 1517 99.1 °F (37.3 °C) 88 16 142/79 -- 98 % None (Room air) Sitting     Pertinent Labs/Diagnostic Test Results:   MRI abdomen w wo contrast   Final Result by Luke Cai MD (05/04 1215)      Hepatomegaly without morphologic features of cirrhosis. 4.3 x 4.2 x 3.9 cm segment 3 liver mass with MR imaging characteristics suggestive of a benign lesion such as focal nodular hyperplasia. Follow-up liver protocol MRI with Eovist is advised for    confirmation.      Significant splenomegaly measuring up to 19 cm in maximal craniocaudal dimension.      The study was marked in EPIC for significant notification.         Workstation performed: [id number]         5/5 MRI inpatient order    (Results Pending)         Results from last 7 days   Lab Units 05/05/24 0442 05/04/24 0612 05/03/24  1622   WBC Thousand/uL 11.82* 11.87* 12.70*   HEMOGLOBIN g/dL 14.2 15.1 15.5   HEMATOCRIT % 43.1 45.4 46.8   PLATELETS Thousands/uL 217 214 211   BANDS PCT %  --  1  --          Results from last 7 days   Lab Units 05/05/24 0442 05/04/24 0612 05/03/24  1622 04/30/24  1201   SODIUM mmol/L 134* 134* 133* 135   POTASSIUM mmol/L 4.5 4.4 4.3 4.0   CHLORIDE mmol/L 101 100 98 103   CO2 mmol/L 27 28 28 24   ANION GAP mmol/L 6 6 7 8   BUN mg/dL 17 14 14 7   CREATININE mg/dL 0.75 0.84 0.84 0.89   EGFR ml/min/1.73sq m 131 125 125 125   CALCIUM mg/dL 9.5 9.0 9.1 9.2   MAGNESIUM mg/dL  --  2.3  --   --      Results from last 7 days   Lab Units 05/05/24  0442 05/04/24  0612 05/03/24  1622 04/30/24  1201   AST U/L 125* 186* 218* 254*   ALT U/L 309*  345* 389* 376*   ALK PHOS U/L 471* 455* 476* 438*   TOTAL PROTEIN g/dL 7.7 7.5 8.0 7.6   ALBUMIN g/dL 3.7 3.6 3.9 3.9   TOTAL BILIRUBIN mg/dL 2.56* 3.50* 4.09* 6.0*         Results from last 7 days   Lab Units 05/05/24  0442 05/04/24  0612 05/03/24  1622 04/30/24  1201   GLUCOSE RANDOM mg/dL 126 84 87 108*       Results from last 7 days   Lab Units 05/04/24  0612 05/03/24  1622   PROTIME seconds 14.3 14.0   INR  1.09 1.06   PTT seconds  --  34       Results from last 7 days   Lab Units 05/03/24  2104   CEA ng/mL 1.0       Results from last 7 days   Lab Units 05/03/24  2050   BLOOD CULTURE  No Growth at 24 hrs.  No Growth at 24 hrs.       ED Treatment:   Medication Administration from 05/03/2024 1504 to 05/03/2024 1816         Date/Time Order Dose Route Action Action by Comments     05/03/2024 1633 EDT diphenhydrAMINE (BENADRYL) injection 25 mg 25 mg Intravenous Given            History reviewed. No pertinent past medical history.  Present on Admission:  **None**      Admitting Diagnosis: Hyperbilirubinemia [E80.6]  Mononucleosis syndrome [B27.90]  Rash [R21]  Transaminitis [R74.01]  Liver mass [R16.0]  Age/Sex: 23 y.o. male  Admission Orders:  Scheduled Medications:  diphenhydrAMINE, 50 mg, Intravenous, Q6H        PRN Meds:  naproxen, 250 mg, Oral, TID PRN  ondansetron, 4 mg, Intravenous, Q6H PRN  phenol, 1 spray, Mouth/Throat, Q2H PRN    diphenhydrAMINE (BENADRYL) tablet 25 mg  Dose: 25 mg  Freq: Every 6 hours PRN Route: PO  PRN Reason: itching  Start: 05/03/24 1911 End: 05/04/24 0038 2122      0038-D/C'd      diphenhydrAMINE-zinc acetate (BENADRYL) 2-0.1 % cream  Freq: 3 times daily PRN Route: TP  PRN Reasons: itching,rash  Start: 05/03/24 2015 End: 05/04/24 0934   Admin Instructions:      Order specific questions:               2034      0934-D/C'd        Gadobutrol injection (SINGLE-DOSE) SOLN 8 mL  Dose: 8 mL  Freq: Once in imaging Route: IV  PRN Reason: contrast  Start: 05/04/24 1034 End: 05/04/24  1042            1042         Gadobutrol injection (SINGLE-DOSE) SOLN 9 mL  Dose: 9 mL  Freq: Once in imaging Route: IV  PRN Reason: contrast  Start: 05/05/24 1056 End: 05/05/24 1103             (1100) [C]     1103        Scd  I&o  Oob  Reg diet      IP CONSULT TO GASTROENTEROLOGY  IP CONSULT TO INFECTIOUS DISEASES  IP CONSULT TO DERMATOLOGY    Network Utilization Review Department  ATTENTION: Please call with any questions or concerns to [phone] and carefully listen to the prompts so that you are directed to the right person. All voicemails are confidential.   For Discharge needs, contact Care Management DC Support Team at [phone] opt. [phone]  Send all requests for admission clinical reviews, approved or denied determinations and any other requests to dedicated fax number below belonging to the campus where the patient is receiving treatment. List of dedicated fax numbers for the Facilities:  FACILITY NAME UR FAX NUMBER   ADMISSION DENIALS (Administrative/Medical Necessity) [phone]   DISCHARGE SUPPORT TEAM (NETWORK) [phone]   PARENT CHILD HEALTH (Maternity/NICU/Pediatrics) [phone]   [facility] [phone]   [facility] [phone]   [facility] [phone]   [facility] [phone]   [facility] [phone]   [facility] [phone]   [facility] [phone]   [facility] [phone]   [facility] [phone]   [facility] [phone]   [facility] [phone]   [facility] [phone]

## 2024-05-04 NOTE — ASSESSMENT & PLAN NOTE
With recently confirmed mononucleosis with positive EBV on 4/30/2024  Clinically stable without  hypotension.  Supportive care.  Avoid antibiotics

## 2024-05-05 ENCOUNTER — APPOINTMENT (INPATIENT)
Dept: MRI IMAGING | Facility: HOSPITAL | Age: 24
DRG: 866 | End: 2024-05-05
Payer: COMMERCIAL

## 2024-05-05 VITALS
SYSTOLIC BLOOD PRESSURE: 110 MMHG | OXYGEN SATURATION: 97 % | RESPIRATION RATE: 15 BRPM | BODY MASS INDEX: 26.89 KG/M2 | WEIGHT: 182.1 LBS | TEMPERATURE: 98.1 F | DIASTOLIC BLOOD PRESSURE: 71 MMHG | HEART RATE: 89 BPM

## 2024-05-05 DIAGNOSIS — R74.01 TRANSAMINITIS: Primary | ICD-10-CM

## 2024-05-05 PROBLEM — B27.00 INFECTIOUS MONONUCLEOSIS DUE TO EPSTEIN-BARR VIRUS (EBV): Status: ACTIVE | Noted: 2024-05-05

## 2024-05-05 LAB
ALBUMIN SERPL BCP-MCNC: 3.7 G/DL (ref 3.5–5)
ALP SERPL-CCNC: 471 U/L (ref 34–104)
ALT SERPL W P-5'-P-CCNC: 309 U/L (ref 7–52)
ANA SER QL IA: NEGATIVE
ANION GAP SERPL CALCULATED.3IONS-SCNC: 6 MMOL/L (ref 4–13)
AST SERPL W P-5'-P-CCNC: 125 U/L (ref 13–39)
BILIRUB SERPL-MCNC: 2.56 MG/DL (ref 0.2–1)
BUN SERPL-MCNC: 17 MG/DL (ref 5–25)
CALCIUM SERPL-MCNC: 9.5 MG/DL (ref 8.4–10.2)
CHLORIDE SERPL-SCNC: 101 MMOL/L (ref 96–108)
CO2 SERPL-SCNC: 27 MMOL/L (ref 21–32)
CREAT SERPL-MCNC: 0.75 MG/DL (ref 0.6–1.3)
ERYTHROCYTE [DISTWIDTH] IN BLOOD BY AUTOMATED COUNT: 14.9 % (ref 11.6–15.1)
GFR SERPL CREATININE-BSD FRML MDRD: 131 ML/MIN/1.73SQ M
GLUCOSE SERPL-MCNC: 126 MG/DL (ref 65–140)
HCT VFR BLD AUTO: 43.1 % (ref 36.5–49.3)
HGB BLD-MCNC: 14.2 G/DL (ref 12–17)
MCH RBC QN AUTO: 28.5 PG (ref 26.8–34.3)
MCHC RBC AUTO-ENTMCNC: 32.9 G/DL (ref 31.4–37.4)
MCV RBC AUTO: 86 FL (ref 82–98)
PLATELET # BLD AUTO: 217 THOUSANDS/UL (ref 149–390)
PMV BLD AUTO: 11.7 FL (ref 8.9–12.7)
POTASSIUM SERPL-SCNC: 4.5 MMOL/L (ref 3.5–5.3)
PROT SERPL-MCNC: 7.7 G/DL (ref 6.4–8.4)
RBC # BLD AUTO: 4.99 MILLION/UL (ref 3.88–5.62)
SODIUM SERPL-SCNC: 134 MMOL/L (ref 135–147)
TREPONEMA PALLIDUM IGG+IGM AB [PRESENCE] IN SERUM OR PLASMA BY IMMUNOASSAY: NORMAL
WBC # BLD AUTO: 11.82 THOUSAND/UL (ref 4.31–10.16)

## 2024-05-05 PROCEDURE — 99239 HOSP IP/OBS DSCHRG MGMT >30: CPT | Performed by: INTERNAL MEDICINE

## 2024-05-05 PROCEDURE — NC001 PR NO CHARGE: Performed by: STUDENT IN AN ORGANIZED HEALTH CARE EDUCATION/TRAINING PROGRAM

## 2024-05-05 PROCEDURE — 74183 MRI ABD W/O CNTR FLWD CNTR: CPT

## 2024-05-05 PROCEDURE — 85027 COMPLETE CBC AUTOMATED: CPT | Performed by: INTERNAL MEDICINE

## 2024-05-05 PROCEDURE — 99233 SBSQ HOSP IP/OBS HIGH 50: CPT | Performed by: INTERNAL MEDICINE

## 2024-05-05 PROCEDURE — 80053 COMPREHEN METABOLIC PANEL: CPT | Performed by: INTERNAL MEDICINE

## 2024-05-05 PROCEDURE — NC001 PR NO CHARGE: Performed by: INTERNAL MEDICINE

## 2024-05-05 PROCEDURE — 99232 SBSQ HOSP IP/OBS MODERATE 35: CPT | Performed by: INTERNAL MEDICINE

## 2024-05-05 PROCEDURE — A9585 GADOBUTROL INJECTION: HCPCS | Performed by: INTERNAL MEDICINE

## 2024-05-05 RX ORDER — POLYETHYLENE GLYCOL 3350 17 G/17G
17 POWDER, FOR SOLUTION ORAL DAILY PRN
Status: DISCONTINUED | OUTPATIENT
Start: 2024-05-05 | End: 2024-05-05 | Stop reason: HOSPADM

## 2024-05-05 RX ORDER — GADOBUTROL 604.72 MG/ML
9 INJECTION INTRAVENOUS
Status: COMPLETED | OUTPATIENT
Start: 2024-05-05 | End: 2024-05-05

## 2024-05-05 RX ORDER — PREDNISONE 20 MG/1
60 TABLET ORAL DAILY
Qty: 9 TABLET | Refills: 0 | Status: SHIPPED | OUTPATIENT
Start: 2024-05-06 | End: 2024-05-09

## 2024-05-05 RX ADMIN — DIPHENHYDRAMINE HYDROCHLORIDE 50 MG: 50 INJECTION, SOLUTION INTRAMUSCULAR; INTRAVENOUS at 03:46

## 2024-05-05 RX ADMIN — GADOBUTROL 9 ML: 604.72 INJECTION INTRAVENOUS at 11:03

## 2024-05-05 RX ADMIN — TRIAMCINOLONE ACETONIDE: 5 CREAM TOPICAL at 09:16

## 2024-05-05 RX ADMIN — DIPHENHYDRAMINE HYDROCHLORIDE 50 MG: 50 INJECTION, SOLUTION INTRAMUSCULAR; INTRAVENOUS at 09:16

## 2024-05-05 RX ADMIN — PREDNISONE 60 MG: 20 TABLET ORAL at 09:15

## 2024-05-05 NOTE — ASSESSMENT & PLAN NOTE
Confirmed benign lesion consistent with focal nodular hyperplasia.  AFP and CEA normal  No further testing at this time.  Outpatient follow-up with GI

## 2024-05-05 NOTE — DISCHARGE SUMMARY
[facility]  Discharge- Jhonatan Whitney 2000, 23 y.o. male MRN: [id number]  Unit/Bed#: [place] [address] -01 Encounter: [id number]  Primary Care Provider: ELIDIA Lord   Date and time admitted to hospital: 5/3/2024  3:18 PM    * Liver mass, left lobe  Assessment & Plan  Confirmed benign lesion consistent with focal nodular hyperplasia.  AFP and CEA normal  No further testing at this time.  Outpatient follow-up with GI    Infectious mononucleosis due to Ramiro-Barr virus (EBV)  Assessment & Plan  Confirmed infectious mononucleosis with elevated LFTs, rash, SIRS on admission  Currently stable for discharge  Outpatient follow-up with PCP with repeat CMP in 1 week    SIRS (systemic inflammatory response syndrome) (HCC)  Assessment & Plan  Secondary to infectious mononucleosis  Improved  Stable for discharge    Rash  Assessment & Plan  Much improved  Continue prednisone 60 mg daily for 3 more days then stop.    Elevated LFTs  Assessment & Plan  Due to EBV mononucleosis  He did have a  positive hep B antibody IgM which was felt to be a false positive.  GI is aware and he will have repeat testing done in the office to confirm           Medical Problems         Resolved Problems  Date Reviewed: 5/5/2024   None         Discharging Physician / Practitioner: Shan Perry MD  PCP: ELIDIA Lord  Admission Date:   Admission Orders (From admission, onward)          Ordered         05/05/24 1008   INPATIENT ADMISSION  Once             05/03/24 1736   Place in Observation  Once                               Discharge Date: 05/05/24     Consultations During Hospital Stay:  GI  ID     Procedures Performed:   none     Significant Findings / Test Results:   MRI abdomen w wo contrast  Result Date: 5/5/2024  Impression: 1.  Segment 3 lesion represent focal nodular hyperplasia. 2.  Hepatosplenomegaly.      MRI abdomen w wo contrast  Result Date:  5/4/2024  Impression: Hepatomegaly without morphologic features of cirrhosis. 4.3 x 4.2 x 3.9 cm segment 3 liver mass with MR imaging characteristics suggestive of a benign lesion such as focal nodular hyperplasia. Follow-up liver protocol MRI with Eovist is advised for confirmation.     Incidental Findings:   + hep B core Ab IgM   GI will see him in the office for repeat testing     Test Results Pending at Discharge (will require follow up):   none     Outpatient Tests Requested:  CBC and CMP in 1 week     Complications:  none     Reason for Admission: Rash     Hospital Course:   Jhonatan Whitney is a 23 y.o. male patient who originally presented to the hospital on 5/3/2024 due to rash from confirmed  EBV infectious mononucleosis.  He was treated with prednisone and as needed Benadryl with good response.  He was seen by GI regarding a liver mass which was seen on recent ultrasound.  MRI with liver protocol confirmed that this was focal nodular hyperplasia.  AFP and CEA levels were normal.  The patient improved and will be going home today with prednisone for 3 more days for his rash and pruritus.  He was advised to follow-up with his PCP in 1 week.  GI will see him in the office for follow-up of the focal nodular hyperplasia and hepatosplenomegaly related to the mononucleosis     Please see above list of diagnoses and related plan for additional information.      Condition at Discharge: good     Discharge Day Visit / Exam:   * Please refer to separate progress note for these details *     Discussion with Family: Updated  (father and mother) at bedside.     Discharge instructions/Information to patient and family:   See after visit summary for information provided to patient and family.       Provisions for Follow-Up Care:  See after visit summary for information related to follow-up care and any pertinent home health orders.          Disposition:   Home     Planned Readmission: no     Discharge  Statement:  I spent >30 minutes discharging the patient. This time was spent on the day of discharge. I had direct contact with the patient on the day of discharge. Greater than 50% of the total time was spent examining patient, answering all patient questions, arranging and discussing plan of care with patient as well as directly providing post-discharge instructions.  Additional time then spent on discharge activities.     Discharge Medications:  See after visit summary for reconciled discharge medications provided to patient and/or family.       **Please Note: This note may have been constructed using a voice recognition system**

## 2024-05-05 NOTE — PROGRESS NOTES
[facility]  Progress Note  Name: Jhonatan Whitney I  MRN: [id number]  Unit/Bed#: [place] [address] -01 I Date of Admission: 5/3/2024   Date of Service: 5/5/2024 I Hospital Day: 0    Assessment/Plan   * Liver mass, left lobe  Assessment & Plan  Confirmed benign lesion consistent with focal nodular hyperplasia.  AFP and CEA normal  No further testing at this time.  Outpatient follow-up with GI    Infectious mononucleosis due to Ramiro-Barr virus (EBV)  Assessment & Plan  Confirmed infectious mononucleosis with elevated LFTs, rash, SIRS on admission  Currently stable for discharge  Outpatient follow-up with PCP with repeat CMP in 1 week    SIRS (systemic inflammatory response syndrome) (HCC)  Assessment & Plan  Secondary to infectious mononucleosis  Improved  Stable for discharge    Rash  Assessment & Plan  Much improved  Continue prednisone 60 mg daily for 3 more days then stop.    Elevated LFTs  Assessment & Plan  Due to EBV mononucleosis  He did have a  positive hep B antibody IgM which was felt to be a false positive.  GI is aware and he will have repeat testing done in the office to confirm         Medical Problems       Resolved Problems  Date Reviewed: 5/5/2024   None       Discharging Physician / Practitioner: Shan Perry MD  PCP: ELIDIA Lord  Admission Date:   Admission Orders (From admission, onward)       Ordered        05/05/24 1008  INPATIENT ADMISSION  Once            05/03/24 1736  Place in Observation  Once                          Discharge Date: 05/05/24    Consultations During Hospital Stay:  GI  ID    Procedures Performed:   none    Significant Findings / Test Results:   MRI abdomen w wo contrast  Result Date: 5/5/2024  Impression: 1.  Segment 3 lesion represent focal nodular hyperplasia. 2.  Hepatosplenomegaly.     MRI abdomen w wo contrast  Result Date: 5/4/2024  Impression: Hepatomegaly without morphologic features of cirrhosis. 4.3 x  4.2 x 3.9 cm segment 3 liver mass with MR imaging characteristics suggestive of a benign lesion such as focal nodular hyperplasia. Follow-up liver protocol MRI with Eovist is advised for confirmation.    Incidental Findings:   + hep B core Ab IgM   GI will see him in the office for repeat testing    Test Results Pending at Discharge (will require follow up):   none     Outpatient Tests Requested:  CBC and CMP in 1 week    Complications:  none    Reason for Admission: Rash    Hospital Course:   Jhonatan Whitney is a 23 y.o. male patient who originally presented to the hospital on 5/3/2024 due to rash from confirmed  EBV infectious mononucleosis.  He was treated with prednisone and as needed Benadryl with good response.  He was seen by GI regarding a liver mass which was seen on recent ultrasound.  MRI with liver protocol confirmed that this was focal nodular hyperplasia.  AFP and CEA levels were normal.  The patient improved and will be going home today with prednisone for 3 more days for his rash and pruritus.  He was advised to follow-up with his PCP in 1 week.  GI will see him in the office for follow-up of the focal nodular hyperplasia and hepatosplenomegaly related to the mononucleosis    Please see above list of diagnoses and related plan for additional information.     Condition at Discharge: good    Discharge Day Visit / Exam:   * Please refer to separate progress note for these details *    Discussion with Family: Updated  (father and mother) at bedside.    Discharge instructions/Information to patient and family:   See after visit summary for information provided to patient and family.      Provisions for Follow-Up Care:  See after visit summary for information related to follow-up care and any pertinent home health orders.        Disposition:   Home    Planned Readmission: no     Discharge Statement:  I spent >30 minutes discharging the patient. This time was spent on the day of  discharge. I had direct contact with the patient on the day of discharge. Greater than 50% of the total time was spent examining patient, answering all patient questions, arranging and discussing plan of care with patient as well as directly providing post-discharge instructions.  Additional time then spent on discharge activities.    Discharge Medications:  See after visit summary for reconciled discharge medications provided to patient and/or family.      **Please Note: This note may have been constructed using a voice recognition system**

## 2024-05-05 NOTE — ASSESSMENT & PLAN NOTE
Due to EBV mononucleosis  He did have a  positive hep B antibody IgM which was felt to be a false positive.  GI is aware and he will have repeat testing done in the office to confirm

## 2024-05-05 NOTE — ASSESSMENT & PLAN NOTE
Due to EBV infectious mononucleosis   Monitor LFTs which may take several weeks to improve   Supportive care  Add miralax for constipation

## 2024-05-05 NOTE — PROGRESS NOTES
Progress Note -  [facility]  Jhonatan Whitney 23 y.o. male MRN: [id number]  Unit/Bed#: [place] [address] -01 Encounter: [id number]      ASSESSMENT AND PLAN:      23-year-old male without known past medical history, who was recently diagnosed with acute monoinfection complicated by markedly elevated liver enzymes and development of jaundice and acute maculopapular rash prompting evaluation in the ED.  We are asked to evaluate due to elevated liver enzymes and a 4.6 cm liver mass noted on ultrasound imaging at outside hospital.     He remains afebrile and clinically stable overall over the last 24 hours.  Macular papular rash is markedly improved with clearance from the face and neck and abdomen on oral and topical steroids.  Suspect this rash was likely related to acute monoinfection versus drug reaction.  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 I discussed the results to the patient and his family and explained this is a benign lesion that does not need follow-up in the future. AFP and CEA on admission were normal. He has no family history of liver cancer.  HBV IgM positive but HBV core total negative, suspect false positive IgM. We will plan to repeat as an outpatient. Follow up remainder of liver workup.    Follow up liver workup.   Okay to be discharged home from GI standpoint with short steroid taper and topical Triamcinolone TID.   Follow up in hepatology clinic.     Rest of care per primary team.    ______________________________________________________________________    Subjective: Patient reports marked improvement in overall symptoms.  He reports rash is significantly improved compared to yesterday.    REVIEW OF SYSTEMS:  10 point ROS reviewed and negative except otherwise noted in the HPI above.     Historical Information   History reviewed. No pertinent past medical history.  History reviewed.  No pertinent surgical history.  Social History   Social History     Substance and Sexual Activity   Alcohol Use Yes    Alcohol/week: 1.0 - 2.0 standard drink of alcohol    Types: 1 - 2 Standard drinks or equivalent per week     Social History     Substance and Sexual Activity   Drug Use Never     Social History     Tobacco Use   Smoking Status Never   Smokeless Tobacco Never     Family History   Problem Relation Age of Onset    Thyroid disease Mother     Migraines Mother     Cancer Father         Bladder Cancer    Colon polyps Father     Lung cancer Maternal Grandmother     Lung cancer Paternal Grandmother     Brain cancer Maternal Aunt        Meds/Allergies     Medications Prior to Admission   Medication    Acetaminophen (TYLENOL PO)    Ibuprofen (MOTRIN PO)     Current Facility-Administered Medications   Medication Dose Route Frequency    diphenhydrAMINE (BENADRYL) injection 50 mg  50 mg Intravenous Q6H    ondansetron (ZOFRAN) injection 4 mg  4 mg Intravenous Q6H PRN    phenol (CHLORASEPTIC) 1.4 % mucosal liquid 1 spray  1 spray Mouth/Throat Q2H PRN    polyethylene glycol (MIRALAX) packet 17 g  17 g Oral Daily PRN    predniSONE tablet 60 mg  60 mg Oral Daily    triamcinolone (KENALOG) 0.5 % cream   Topical TID       No Known Allergies      Objective     Blood pressure 110/71, pulse 89, temperature 98.1 °F (36.7 °C), temperature source Oral, resp. rate 15, weight 82.6 kg (182 lb 1.6 oz), SpO2 97%. Body mass index is 26.89 kg/m².    PHYSICAL EXAM:    General: NAD  Eyes: + mild conjunctival icterus   Abdominal: Soft, non-tender, non-distended  Skin: Improving diffuse maculopapular rash  Neuro: alert and oriented  Psych: Normal affect      Lab Results:   Admission on 05/03/2024   Component Date Value    WBC 05/03/2024 12.70 (H)     RBC 05/03/2024 5.42     Hemoglobin 05/03/2024 15.5     Hematocrit 05/03/2024 46.8     MCV 05/03/2024 86     MCH 05/03/2024 28.6     MCHC 05/03/2024 33.1     RDW 05/03/2024 14.9     MPV  05/03/2024 10.7     Platelets 05/03/2024 211     Sodium 05/03/2024 133 (L)     Potassium 05/03/2024 4.3     Chloride 05/03/2024 98     CO2 05/03/2024 28     ANION GAP 05/03/2024 7     BUN 05/03/2024 14     Creatinine 05/03/2024 0.84     Glucose 05/03/2024 87     Calcium 05/03/2024 9.1     AST 05/03/2024 218 (H)     ALT 05/03/2024 389 (H)     Alkaline Phosphatase 05/03/2024 476 (H)     Total Protein 05/03/2024 8.0     Albumin 05/03/2024 3.9     Total Bilirubin 05/03/2024 4.09 (H)     eGFR 05/03/2024 125     Protime 05/03/2024 14.0     INR 05/03/2024 1.06     PTT 05/03/2024 34     Hepatitis B Surface Ag 05/03/2024 Non-reactive     Hep A IgM 05/03/2024 Non-reactive     Hepatitis C Ab 05/03/2024 Non-reactive     Hep B C IgM 05/03/2024 Reactive (A)     EBV VCA IgG 05/03/2024 84.6 (H)     EBV VCA IgM 05/03/2024 >160.0 (H)     EBV Nuclear Ag Ab 05/03/2024 <18.0     INTERPRETATION 05/03/2024 Comment     Segmented % 05/03/2024 19 (L)     Lymphocytes % 05/03/2024 71 (H)     Monocytes % 05/03/2024 4     Eosinophils % 05/03/2024 2     Basophils % 05/03/2024 0     Atypical Lymphocytes % 05/03/2024 4 (H)     Absolute Neutrophils 05/03/2024 2.41     Absolute Lymphocytes 05/03/2024 9.53 (H)     Absolute Monocytes 05/03/2024 0.51     Absolute Eosinophils 05/03/2024 0.25     Absolute Basophils 05/03/2024 0.00     RBC Morphology 05/03/2024 Normal     Platelet Estimate 05/03/2024 Adequate     Blood Culture 05/03/2024 No Growth at 24 hrs.     Blood Culture 05/03/2024 No Growth at 24 hrs.     Hepatitis B Surface Ag 05/03/2024 Non-reactive     Hepatitis C Ab 05/03/2024 Non-reactive     Hep B C IgM 05/03/2024 Reactive (A)     Hep B Core Total Ab 05/03/2024 Non-reactive     AFP TUMOR MARKER 05/03/2024 2.02     WBC 05/04/2024 11.87 (H)     RBC 05/04/2024 5.31     Hemoglobin 05/04/2024 15.1     Hematocrit 05/04/2024 45.4     MCV 05/04/2024 86     MCH 05/04/2024 28.4     MCHC 05/04/2024 33.3     RDW 05/04/2024 14.9     MPV 05/04/2024 11.4      Platelets 05/04/2024 214     Sodium 05/04/2024 134 (L)     Potassium 05/04/2024 4.4     Chloride 05/04/2024 100     CO2 05/04/2024 28     ANION GAP 05/04/2024 6     BUN 05/04/2024 14     Creatinine 05/04/2024 0.84     Glucose 05/04/2024 84     Glucose, Fasting 05/04/2024 84     Calcium 05/04/2024 9.0     AST 05/04/2024 186 (H)     ALT 05/04/2024 345 (H)     Alkaline Phosphatase 05/04/2024 455 (H)     Total Protein 05/04/2024 7.5     Albumin 05/04/2024 3.6     Total Bilirubin 05/04/2024 3.50 (H)     eGFR 05/04/2024 125     Magnesium 05/04/2024 2.3     Protime 05/04/2024 14.3     INR 05/04/2024 1.09     Iron Saturation 05/04/2024 29     TIBC 05/04/2024 356     Iron 05/04/2024 102     UIBC 05/04/2024 254     Ferritin 05/04/2024 331     CEA 05/03/2024 1.0     OLGA 05/04/2024 Negative     Rapid HIV 1 AND 2 05/04/2024 Non-Reactive     HIV-1 P24 Ag Screen 05/04/2024 Non-Reactive     Hep B Core Total Ab 05/03/2024 Non-reactive     Segmented % 05/04/2024 4 (L)     Bands % 05/04/2024 1     Lymphocytes % 05/04/2024 81 (H)     Monocytes % 05/04/2024 5     Eosinophils % 05/04/2024 1     Basophils % 05/04/2024 0     Atypical Lymphocytes % 05/04/2024 8 (H)     Absolute Neutrophils 05/04/2024 0.59 (L)     Absolute Lymphocytes 05/04/2024 10.56 (H)     Absolute Monocytes 05/04/2024 0.59     Absolute Eosinophils 05/04/2024 0.12     Absolute Basophils 05/04/2024 0.00     RBC Morphology 05/04/2024 Normal     Platelet Estimate 05/04/2024 Adequate     Syphilis Total Antibody 05/04/2024 Non-reactive     WBC 05/05/2024 11.82 (H)     RBC 05/05/2024 4.99     Hemoglobin 05/05/2024 14.2     Hematocrit 05/05/2024 43.1     MCV 05/05/2024 86     MCH 05/05/2024 28.5     MCHC 05/05/2024 32.9     RDW 05/05/2024 14.9     MPV 05/05/2024 11.7     Platelets 05/05/2024 217     Sodium 05/05/2024 134 (L)     Potassium 05/05/2024 4.5     Chloride 05/05/2024 101     CO2 05/05/2024 27     ANION GAP 05/05/2024 6     BUN 05/05/2024 17     Creatinine 05/05/2024  0.75     Glucose 05/05/2024 126     Calcium 05/05/2024 9.5     AST 05/05/2024 125 (H)     ALT 05/05/2024 309 (H)     Alkaline Phosphatase 05/05/2024 471 (H)     Total Protein 05/05/2024 7.7     Albumin 05/05/2024 3.7     Total Bilirubin 05/05/2024 2.56 (H)     eGFR 05/05/2024 131        Imaging Studies: I have personally reviewed pertinent imaging studies.    Fadia Dominguez D.O.  Fellow, PGY-5  Division of Gastroenterology & Hepatology  Available on [facility]  5/5/2024 2:21 PM

## 2024-05-05 NOTE — ASSESSMENT & PLAN NOTE
Secondary to infectious mononucleosis  He has no other additional infection  Supportive care  Avoid antibiotics

## 2024-05-05 NOTE — ASSESSMENT & PLAN NOTE
Confirmed infectious mononucleosis with elevated LFTs, rash, SIRS on admission  Currently stable for discharge  Outpatient follow-up with PCP with repeat CMP in 1 week

## 2024-05-05 NOTE — NURSING NOTE
Pt discharge to home, accompanied by father. No s/s of distress or discomfort noted. Discharge instructions given and stated that they understood. Pt took all personal belongings.

## 2024-05-05 NOTE — PROGRESS NOTES
Progress Note - Infectious Disease   Jhonatan Whitney 23 y.o. male MRN: [id number]  Unit/Bed#: [place] [address] -01 Encounter: [id number]      Impression/Plan:  1.  Systemic inflammatory response syndrome.  Suspect that the patient has an EBV acute mononucleosis syndrome..  Doubt any acute bacterial process.  Fortunately the patient remains hemodynamically stable.  -Monitor off all antibiotics  -Workup as below  -Follow-up blood cultures  -Supportive care     2.  Acute mononucleosis.  With suspected hematologic abnormalities (atypical lymphocytosis), liver function test abnormalities, global fatigue, with hepatomegaly and splenomegaly.  His Monospot was positive in the emergency department at [facility].  EBV VCA IgM is positive consistent with an Ramiro-Barr virus infection.  HIV screen and syphilis screen negative.  Complicated by neutropenia  -Supportive care  -No contact sports for the next month including other vigorous activities such as weightlifting  -Recheck CBC with differential and CMP to make sure not worsening     3.  Liver mass.  Left lobe.  Appears to be a benign liver mass based upon the MRI findings as well as normal AFP and CEA.  Repeat MRI with liver protocol consistent with focal nodular hyperplasia  -GI follow-up     4.  Rash.  Suspect patient may be having a drug reaction to the nonsteroidals.  He has not received any beta-lactam antibiotics..  This can been seen in 10% of cases with EBV related mononucleosis.  Much more common if given a beta-lactam drug.  Substantially improved since starting steroids yesterday  -Prednisone as per the primary but would limit the treatment duration to 5 days  -Continue Benadryl  -Serial exams    5.  Positive hepatitis B core IgM.  Almost certainly a false positive as the total hep B core is negative.  -Check hepatitis B DNA to confirm this is a contaminant    6.  Neutropenia.  Likely all secondary to the EBV infection.  Patient remains afebrile at this  time.  -Recheck CBC with differential in 1 week  -If patient discharged told him that he needs to seek medical care immediately if he has any fever.    Discussed with the primary service the plan to monitor off all antibiotics and they agree with the plan    Discussed in detail with the patient and his father    I have spent a total time of 50 minutes on 24 in caring for this patient including Diagnostic results, Prognosis, Patient and family education, Impressions, Counseling / Coordination of care, Documenting in the medical record, Reviewing / ordering tests, medicine, procedures  , Obtaining or reviewing history  , and Communicating with other healthcare professionals .     Antibiotics:  None    Subjective:  Patient has no fever, chills, sweats; no nausea, vomiting, diarrhea; no cough, shortness of breath; no pain. No new symptoms.  Rash is improved with less pruritus    Objective:  Vitals:  Temp:  [97.7 °F (36.5 °C)-98.1 °F (36.7 °C)] 98.1 °F (36.7 °C)  HR:  [89] 89  Resp:  [15-20] 15  BP: (110-123)/(67-71) 110/71  SpO2:  [97 %] 97 %  Temp (24hrs), Av.9 °F (36.6 °C), Min:97.7 °F (36.5 °C), Max:98.1 °F (36.7 °C)  Current: Temperature: 98.1 °F (36.7 °C)    Physical Exam:   General Appearance:  Alert, interactive, nontoxic, no acute distress.   Throat: Oropharynx moist without lesions.    Lungs:   Clear to auscultation bilaterally; no wheezes, rhonchi or rales; respirations unlabored   Heart:  RRR; no murmur, rub or gallop   Abdomen:   Soft, non-tender, non-distended, positive bowel sounds.     Extremities: No clubbing, cyanosis or edema   Skin: Substantially improved maculopapular eruption       Labs, Imaging, & Other studies:   All pertinent labs and imaging studies were personally reviewed  Results from last 7 days   Lab Units 24  0442 24  0612 24  1622   WBC Thousand/uL 11.82* 11.87* 12.70*   HEMOGLOBIN g/dL 14.2 15.1 15.5   PLATELETS Thousands/uL 217 214 211     Results from last 7  days   Lab Units 05/05/24  0442 05/04/24  0612 05/03/24  1622   SODIUM mmol/L 134* 134* 133*   POTASSIUM mmol/L 4.5 4.4 4.3   CHLORIDE mmol/L 101 100 98   CO2 mmol/L 27 28 28   BUN mg/dL 17 14 14   CREATININE mg/dL 0.75 0.84 0.84   EGFR ml/min/1.73sq m 131 125 125   CALCIUM mg/dL 9.5 9.0 9.1   AST U/L 125* 186* 218*   ALT U/L 309* 345* 389*   ALK PHOS U/L 471* 455* 476*     Results from last 7 days   Lab Units 05/03/24  2050   BLOOD CULTURE  No Growth at 24 hrs.  No Growth at 24 hrs.             Results from last 7 days   Lab Units 05/04/24  0612   FERRITIN ng/mL 331         Repeat MRI.  Mass consistent with focal nodular hyperplasia.  Hepatosplenomegaly    Images personally reviewed by me in PACS and interpreted by me

## 2024-05-05 NOTE — DISCHARGE INSTR - AVS FIRST PAGE
Take prednisone once a day for 3 more days. Take with meal  Follow up with your pcp in 1 week. Please have repeat blood test done  The GI office will call you for follow up  NO contact sports or strenuous activities for at least 4 weeks

## 2024-05-05 NOTE — CONSULTS
DERMATOLOGY:  CONSULTATION   Jhonatan Whitney 23 y.o. male MRN: [id number]  Unit/Bed#: [place] [address] -01 Encounter: [id number]          Inpatient consult to Dermatology     Date/Time  5/5/2024 2:54 PM     Performed by  Rosio Fleming MD   Authorized by  Shan Perry MD                 DERM CONSULT - Attending and resident present VIRTUALLY. Patient photos reviewed. Patient in hospital setting.  Assessment can only be provided based on clinical images received and symptoms conveyed.       Assessment/Recommendations   Mononucleosis rash    Morbilliform rash can be seen in 15% pts w mono even without receiving antibiotics. Regiscar for DRESS is no case even if biopsy were to be consistent. LFT elevation and atypical lymphs likely due to mono, also has + hep B core IgM.   -cont to trend lfts, cbc w diff   -check baseline TSH, T4  -cont. Prednisone 60mg for 10 days  -outpt fu w derm will be arranged      Please set up discharge follow up by calling our office: [phone])     Thank you for involving me in the care of your patient. Please call with questions, change in clinical status or if tests recommended above are abnormal.     Discussed with the primary service.      History:     HISTORY OF PRESENT ILLNESS:    Reason for Consult: rash  HPI: Jhonatan Whitney is a 23 y.o. year old male with diffuse pruritic morbilliform rash involving acral surfaces for past few days. There is no mucosal involvement or facial edema, no lymphadenopathy. Pt is afebrile. Monospot and EBV positive. Pt has not received any antibiotics, does not take any medications.       Review of Systems:  Review of Systems      PAST MEDICAL HISTORY:  History reviewed. No pertinent past medical history.  History reviewed. No pertinent surgical history.    FAMILY HISTORY:  Non-contributory    SOCIAL HISTORY:  Social History   Single  Social History     Substance and Sexual Activity   Alcohol Use Yes    Alcohol/week: 1.0 - 2.0  standard drink of alcohol    Types: 1 - 2 Standard drinks or equivalent per week     Social History     Substance and Sexual Activity   Drug Use Never     Social History     Tobacco Use   Smoking Status Never   Smokeless Tobacco Never       ALLERGIES:  No Known Allergies    MEDICATIONS:  All current active medications have been reviewed.       Physical exam:     Temp:  [97.7 °F (36.5 °C)-98.1 °F (36.7 °C)] 98.1 °F (36.7 °C)  HR:  [89] 89  Resp:  [15-20] 15  BP: (110-123)/(67-71) 110/71  SpO2:  [97 %] 97 %  Temp (24hrs), Av.9 °F (36.6 °C), Min:97.7 °F (36.5 °C), Max:98.1 °F (36.7 °C)  Current: Temperature: 98.1 °F (36.7 °C)      PHOTOGRAPHIC SKIN EXAMINATION                       Labs, Imaging, & Other Studies     Lab Results:  I have personally reviewed pertinent labs.     Results from last 7 days   Lab Units 24  04424  0624  1622   WBC Thousand/uL 11.82* 11.87* 12.70*   HEMOGLOBIN g/dL 14.2 15.1 15.5   PLATELETS Thousands/uL 217 214 211     Results from last 7 days   Lab Units 24  04424  0612 24  1622   POTASSIUM mmol/L 4.5 4.4 4.3   CHLORIDE mmol/L 101 100 98   CO2 mmol/L 27 28 28   BUN mg/dL 17 14 14   CREATININE mg/dL 0.75 0.84 0.84   EGFR ml/min/1.73sq m 131 125 125   CALCIUM mg/dL 9.5 9.0 9.1   AST U/L 125* 186* 218*   ALT U/L 309* 345* 389*   ALK PHOS U/L 471* 455* 476*     Results from last 7 days   Lab Units 24   BLOOD CULTURE  No Growth at 24 hrs.  No Growth at 24 hrs.           Pathology:   I have personally reviewed pertinent reports.       Rosio Fleming MD  Dermatology, PGY-2

## 2024-05-05 NOTE — ASSESSMENT & PLAN NOTE
Confirmed 4.3 x 4.2 x 3.9 cm mass with features consistent with focal nodular hyperplasia  Patient and parents were given reassurance but they still want to pursue obtaining a liver protocol MRI which was stated on the radiology report.

## 2024-05-05 NOTE — PLAN OF CARE
Problem: PAIN - ADULT  Goal: Verbalizes/displays adequate comfort level or baseline comfort level  Description: Interventions:  - Encourage patient to monitor pain and request assistance  - Assess pain using appropriate pain scale  - Administer analgesics based on type and severity of pain and evaluate response  - Implement non-pharmacological measures as appropriate and evaluate response  - Consider cultural and social influences on pain and pain management  - Notify physician/advanced practitioner if interventions unsuccessful or patient reports new pain  Outcome: Progressing     Problem: INFECTION - ADULT  Goal: Absence or prevention of progression during hospitalization  Description: INTERVENTIONS:  - Assess and monitor for signs and symptoms of infection  - Monitor lab/diagnostic results  - Monitor all insertion sites, i.e. indwelling lines, tubes, and drains  - Monitor endotracheal if appropriate and nasal secretions for changes in amount and color  - [place] appropriate cooling/warming therapies per order  - Administer medications as ordered  - Instruct and encourage patient and family to use good hand hygiene technique  - Identify and instruct in appropriate isolation precautions for identified infection/condition  Outcome: Progressing  Goal: Absence of fever/infection during neutropenic period  Description: INTERVENTIONS:  - Monitor WBC    Outcome: Progressing     Problem: SAFETY ADULT  Goal: Patient will remain free of falls  Description: INTERVENTIONS:  - Educate patient/family on patient safety including physical limitations  - Instruct patient to call for assistance with activity   - Consult OT/PT to assist with strengthening/mobility   - Keep Call bell within reach  - Keep bed low and locked with side rails adjusted as appropriate  - Keep care items and personal belongings within reach  - Initiate and maintain comfort rounds  - Make Fall Risk Sign visible to staff  - Offer Toileting every 2 Hours,  in advance of need  - Initiate/Maintain bed alarm  - Obtain necessary fall risk management equipment: non skid footware.  - Apply yellow socks and bracelet for high fall risk patients  - Consider moving patient to room near nurses station  Outcome: Progressing  Goal: Maintain or return to baseline ADL function  Description: INTERVENTIONS:  -  Assess patient's ability to carry out ADLs; assess patient's baseline for ADL function and identify physical deficits which impact ability to perform ADLs (bathing, care of mouth/teeth, toileting, grooming, dressing, etc.)  - Assess/evaluate cause of self-care deficits   - Assess range of motion  - Assess patient's mobility; develop plan if impaired  - Assess patient's need for assistive devices and provide as appropriate  - Encourage maximum independence but intervene and supervise when necessary  - Involve family in performance of ADLs  - Assess for home care needs following discharge   - Consider OT consult to assist with ADL evaluation and planning for discharge  - Provide patient education as appropriate  Outcome: Progressing  Goal: Maintains/Returns to pre admission functional level  Description: INTERVENTIONS:  - Perform AM-PAC 6 Click Basic Mobility/ Daily Activity assessment daily.  - Set and communicate daily mobility goal to care team and patient/family/caregiver.   - Collaborate with rehabilitation services on mobility goals if consulted  - Perform Range of Motion 3 times a day.  - Reposition patient every 2 hours.  - Dangle patient 3 times a day  - Stand patient 3 times a day  - Ambulate patient 3 times a day  - Out of bed to chair 3 times a day   - Out of bed for meals 3 times a day  - Out of bed for toileting  - Record patient progress and toleration of activity level   Outcome: Progressing     Problem: DISCHARGE PLANNING  Goal: Discharge to home or other facility with appropriate resources  Description: INTERVENTIONS:  - Identify barriers to discharge w/patient  and caregiver  - Arrange for needed discharge resources and transportation as appropriate  - Identify discharge learning needs (meds, wound care, etc.)  - Arrange for interpretive services to assist at discharge as needed  - Refer to Case Management Department for coordinating discharge planning if the patient needs post-hospital services based on physician/advanced practitioner order or complex needs related to functional status, cognitive ability, or social support system  Outcome: Progressing     Problem: Knowledge Deficit  Goal: Patient/family/caregiver demonstrates understanding of disease process, treatment plan, medications, and discharge instructions  Description: Complete learning assessment and assess knowledge base.  Interventions:  - Provide teaching at level of understanding  - Provide teaching via preferred learning methods  Outcome: Progressing     Problem: METABOLIC, FLUID AND ELECTROLYTES - ADULT  Goal: Electrolytes maintained within normal limits  Description: INTERVENTIONS:  - Monitor labs and assess patient for signs and symptoms of electrolyte imbalances  - Administer electrolyte replacement as ordered  - Monitor response to electrolyte replacements, including repeat lab results as appropriate  - Instruct patient on fluid and nutrition as appropriate  Outcome: Progressing  Goal: Fluid balance maintained  Description: INTERVENTIONS:  - Monitor labs   - Monitor I/O and WT  - Instruct patient on fluid and nutrition as appropriate  - Assess for signs & symptoms of volume excess or deficit  Outcome: Progressing     Problem: SKIN/TISSUE INTEGRITY - ADULT  Goal: Skin Integrity remains intact(Skin Breakdown Prevention)  Description: Assess:  -Perform Oracio assessment every shift.  -Clean and moisturize skin every shift.  -Inspect skin when repositioning, toileting, and assisting with ADLS  -Assess under medical devices.  -Assess extremities for adequate circulation and sensation     Bed  Management:  -Have minimal linens on bed & keep smooth, unwrinkled  -Change linens as needed when moist or perspiring  -Avoid sitting or lying in one position for more than 2 hours while in bed  -Keep HOB at 45 degrees     Toileting:  -Offer bedside commode  -Assess for incontinence every 2 hours.  -Use incontinent care products after each incontinent episode such as barrier creams.    Activity:  -Mobilize patient 3 times a day  -Encourage activity and walks on unit  -Encourage or provide ROM exercises   -Turn and reposition patient every 2 Hours  -Use appropriate equipment to lift or move patient in bed  -Instruct/ Assist with weight shifting every hour when out of bed in chair  -Consider limitation of chair time 2 hour intervals    Skin Care:  -Avoid use of baby powder, tape, friction and shearing, hot water or constrictive clothing  -Relieve pressure over bony prominences using silicone foam.  -Do not massage red bony areas    Next Steps:  -Teach patient strategies to minimize risks.   -Consider consults to  interdisciplinary teams such as PT/OT.  Outcome: Progressing

## 2024-05-05 NOTE — ASSESSMENT & PLAN NOTE
Diffuse maculopapular rash involving the torso and extremities  Secondary to infectious mononucleosis  Improving  On day 2 of prednisone  Continue Benadryl for itching  Avoid antibiotic

## 2024-05-05 NOTE — PLAN OF CARE
Problem: PAIN - ADULT  Goal: Verbalizes/displays adequate comfort level or baseline comfort level  Description: Interventions:  - Encourage patient to monitor pain and request assistance  - Assess pain using appropriate pain scale  - Administer analgesics based on type and severity of pain and evaluate response  - Implement non-pharmacological measures as appropriate and evaluate response  - Consider cultural and social influences on pain and pain management  - Notify physician/advanced practitioner if interventions unsuccessful or patient reports new pain  Outcome: Progressing     Problem: INFECTION - ADULT  Goal: Absence or prevention of progression during hospitalization  Description: INTERVENTIONS:  - Assess and monitor for signs and symptoms of infection  - Monitor lab/diagnostic results  - Monitor all insertion sites, i.e. indwelling lines, tubes, and drains  - Monitor endotracheal if appropriate and nasal secretions for changes in amount and color  - [place] appropriate cooling/warming therapies per order  - Administer medications as ordered  - Instruct and encourage patient and family to use good hand hygiene technique  - Identify and instruct in appropriate isolation precautions for identified infection/condition  Outcome: Progressing     Problem: SKIN/TISSUE INTEGRITY - ADULT  Goal: Skin Integrity remains intact(Skin Breakdown Prevention)  Description: Assess:  -Perform Oracio assessment   -Clean and moisturize skin   -Inspect skin when repositioning, toileting, and assisting with ADLS  -Assess under medical devices   -Assess extremities for adequate circulation and sensation     Bed Management:  -Have minimal linens on bed & keep smooth, unwrinkled  -Change linens as needed when moist or perspiring  -Avoid sitting or lying in one position for more than 2 hours while in bed  -Keep HOB at 30 degrees     Toileting:  -Offer bedside commode  -Assess for incontinence   -Use incontinent care products after  each incontinent episode    Activity:  -Mobilize patient 3 times a day  -Encourage activity and walks on unit  -Encourage or provide ROM exercises   -Turn and reposition patient every 2  Hours  -Use appropriate equipment to lift or move patient in bed  -Instruct/ Assist with weight shifting  when out of bed in chair  -Consider limitation of chair time  hour intervals    Skin Care:  -Avoid use of baby powder, tape, friction and shearing, hot water or constrictive clothing  -Relieve pressure over bony prominences   -Do not massage red bony areas    Next Steps:  -Teach patient strategies to minimize risks    -Consider consults to  interdisciplinary teams   Outcome: Progressing

## 2024-05-05 NOTE — PROGRESS NOTES
Formerly [facility], later [facility]  Progress Note  Name: Jhonatan Whitney I  MRN: [id number]  Unit/Bed#: [place] [address] -01 I Date of Admission: 5/3/2024   Date of Service: 2024 I Hospital Day: 0    Assessment/Plan   * Liver mass, left lobe  Assessment & Plan  Confirmed 4.3 x 4.2 x 3.9 cm mass with features consistent with focal nodular hyperplasia  Patient and parents were given reassurance but they still want to pursue obtaining a liver protocol MRI which was stated on the radiology report.    SIRS (systemic inflammatory response syndrome) (HCC)  Assessment & Plan  Secondary to infectious mononucleosis  He has no other additional infection  Supportive care  Avoid antibiotics    Elevated LFTs  Assessment & Plan  Due to EBV infectious mononucleosis   Monitor LFTs which may take several weeks to improve   Supportive care  Add miralax for constipation    Rash  Assessment & Plan  Diffuse maculopapular rash involving the torso and extremities  Secondary to infectious mononucleosis  Improving  On day 2 of prednisone  Continue Benadryl for itching  Avoid antibiotic         VTE Pharmacologic Prophylaxis: VTE Score: 0 Low Risk (Score 0-2) - Encourage Ambulation.    Patient Centered Rounds: I performed bedside rounds with nursing staff today.   Discussions with Specialists or Other Care Team Provider: ANGÉLICA Dominguez  Education and Discussions with Family / Patient: Updated  (father and mother) at bedside.    Current Length of Stay: 0 day(s)  Current Patient Status: Inpatient   Certification Statement: The patient, admitted on an observation basis, will now require > 2 midnight hospital stay due to rash, liver mri  Discharge Plan: Anticipate discharge tomorrow to home.    Code Status: Level 1 - Full Code    Subjective:   Improving rash and itching  NO difficulty breathing or swallowing  Awaiting liver mri  + constipation for 4 days    Objective:     Vitals:   Temp (24hrs), Av.9 °F (36.6 °C),  Min:97.7 °F (36.5 °C), Max:98.1 °F (36.7 °C)    Temp:  [97.7 °F (36.5 °C)-98.1 °F (36.7 °C)] 98.1 °F (36.7 °C)  HR:  [89] 89  Resp:  [15-20] 15  BP: (110-123)/(67-71) 110/71  SpO2:  [97 %] 97 %  Body mass index is 26.89 kg/m².     Input and Output Summary (last 24 hours):     Intake/Output Summary (Last 24 hours) at 5/5/2024 1015  Last data filed at 5/5/2024 0543  Gross per 24 hour   Intake 600 ml   Output 200 ml   Net 400 ml       Physical Exam:   Physical Exam  Vitals reviewed.   Constitutional:       Appearance: He is not ill-appearing.   HENT:      Head: Normocephalic and atraumatic.      Mouth/Throat:      Comments: Improving pharyngeal erythema  Eyes:      General: No scleral icterus.  Cardiovascular:      Rate and Rhythm: Normal rate and regular rhythm.   Pulmonary:      Breath sounds: No wheezing, rhonchi or rales.   Abdominal:      General: There is no distension.      Palpations: Abdomen is soft.      Tenderness: There is no abdominal tenderness.   Musculoskeletal:      Cervical back: Neck supple. No rigidity.      Right lower leg: No edema.      Left lower leg: No edema.   Skin:     Comments: Improving skin rash   Neurological:      Mental Status: He is oriented to person, place, and time.   Psychiatric:         Mood and Affect: Mood normal.         Behavior: Behavior normal.         Additional Data:     Labs:  Results from last 7 days   Lab Units 05/05/24  0442 05/04/24  0612   WBC Thousand/uL 11.82* 11.87*   HEMOGLOBIN g/dL 14.2 15.1   HEMATOCRIT % 43.1 45.4   PLATELETS Thousands/uL 217 214   BANDS PCT %  --  1   LYMPHO PCT %  --  81*   MONO PCT %  --  5   EOS PCT %  --  1     Results from last 7 days   Lab Units 05/05/24  0442   SODIUM mmol/L 134*   POTASSIUM mmol/L 4.5   CHLORIDE mmol/L 101   CO2 mmol/L 27   BUN mg/dL 17   CREATININE mg/dL 0.75   ANION GAP mmol/L 6   CALCIUM mg/dL 9.5   ALBUMIN g/dL 3.7   TOTAL BILIRUBIN mg/dL 2.56*   ALK PHOS U/L 471*   ALT U/L 309*   AST U/L 125*   GLUCOSE RANDOM  mg/dL 126     Results from last 7 days   Lab Units 05/04/24  0612   INR  1.09                   Lines/Drains:  Invasive Devices       Peripheral Intravenous Line  Duration             Peripheral IV 05/03/24 Left Antecubital 1 day                          Imaging: Reviewed radiology reports from this admission including: MRI abdomen/MRCP    Recent Cultures (last 7 days):   Results from last 7 days   Lab Units 05/03/24  2050   BLOOD CULTURE  No Growth at 24 hrs.  No Growth at 24 hrs.       Last 24 Hours Medication List:   Current Facility-Administered Medications   Medication Dose Route Frequency Provider Last Rate    diphenhydrAMINE  50 mg Intravenous Q6H Shan Perry MD      ondansetron  4 mg Intravenous Q6H PRN Amadou Dillon MD      phenol  1 spray Mouth/Throat Q2H PRN Rosendo Luna PA-C      polyethylene glycol  17 g Oral Daily PRN Shan Perry MD      predniSONE  60 mg Oral Daily Shan Perry MD      triamcinolone   Topical TID Fadia Dominguez DO          Today, Patient Was Seen By: Shan Perry MD    **Please Note: This note may have been constructed using a voice recognition system.**

## 2024-05-06 ENCOUNTER — TELEPHONE (OUTPATIENT)
Dept: GASTROENTEROLOGY | Facility: CLINIC | Age: 24
End: 2024-05-06

## 2024-05-06 DIAGNOSIS — B27.00 INFECTIOUS MONONUCLEOSIS DUE TO EPSTEIN-BARR VIRUS (EBV): Primary | ICD-10-CM

## 2024-05-06 LAB
A1AT SERPL-MCNC: 118 MG/DL (ref 95–164)
ACTIN IGG SERPL-ACNC: 32 UNITS (ref 0–19)
CERULOPLASMIN SERPL-MCNC: 34.5 MG/DL (ref 16–31)
CMV DNA SERPL NAA+PROBE-ACNC: NOT DETECTED [IU]/ML
HBV DNA SERPL NAA+PROBE-ACNC: NOT DETECTED [IU]/ML
MITOCHONDRIA M2 IGG SER-ACNC: <20 UNITS (ref 0–20)

## 2024-05-06 NOTE — TELEPHONE ENCOUNTER
----- Message from Ace Massey MD sent at 5/5/2024  9:05 PM EDT -----  Dear staff: I am ordering LFTs for the patient.  Please kindly let patient know to get LFTs in 1 to 2 weeks.

## 2024-05-06 NOTE — UTILIZATION REVIEW
NOTIFICATION OF ADMISSION DISCHARGE   This is a Notification of Discharge from [facility]. Please be advised that this patient has been discharge from our facility. Below you will find the admission and discharge date and time including the patient’s disposition.   UTILIZATION REVIEW CONTACT:  Che Carrillo  Utilization   Network Utilization Review Department  Phone: [phone] x carefully listen to the prompts. All voicemails are confidential.  Email: [email or web address]     ADMISSION INFORMATION  PRESENTATION DATE: 5/3/2024  3:18 PM  OBERVATION ADMISSION DATE:   INPATIENT ADMISSION DATE: 5/5/24 10:08 AM   DISCHARGE DATE: 5/5/2024  2:45 PM   DISPOSITION:Home/Self Care    Network Utilization Review Department  ATTENTION: Please call with any questions or concerns to [phone] and carefully listen to the prompts so that you are directed to the right person. All voicemails are confidential.   For Discharge needs, contact Care Management DC Support Team at [phone] opt. 2  Send all requests for admission clinical reviews, approved or denied determinations and any other requests to dedicated fax number below belonging to the campus where the patient is receiving treatment. List of dedicated fax numbers for the Facilities:  FACILITY NAME UR FAX NUMBER   ADMISSION DENIALS (Administrative/Medical Necessity) [phone]   DISCHARGE SUPPORT TEAM ([facility]) [phone]   PARENT CHILD HEALTH (Maternity/NICU/Pediatrics) [phone]   [facility] [phone]   [facility] [phone]   [facility] [phone]   [facility] [phone]   [facility] [phone]   [facility] [phone]   [facility] [phone]   [facility] [phone]   [facility] [phone]   [facility] [phone]   [facility] [phone]   [facility] [phone]

## 2024-05-07 ENCOUNTER — TELEPHONE (OUTPATIENT)
Dept: DERMATOLOGY | Facility: CLINIC | Age: 24
End: 2024-05-07

## 2024-05-07 NOTE — TELEPHONE ENCOUNTER
Called and spoke to patient to get him scheduled for a follow up per Dr. Fleming patient reported that he is under the care of [facility] at this time.

## 2024-05-07 NOTE — UTILIZATION REVIEW
NOTIFICATION OF ADMISSION DISCHARGE   This is a Notification of Discharge from [facility]. Please be advised that this patient has been discharge from our facility. Below you will find the admission and discharge date and time including the patient’s disposition.   UTILIZATION REVIEW CONTACT:  Che Carrillo  Utilization   Network Utilization Review Department  Phone: [phone] x carefully listen to the prompts. All voicemails are confidential.  Email: [email or web address]     ADMISSION INFORMATION  PRESENTATION DATE: 5/3/2024  3:18 PM  OBERVATION ADMISSION DATE:   INPATIENT ADMISSION DATE: 5/5/24 10:08 AM   DISCHARGE DATE: 5/5/2024  2:45 PM   DISPOSITION:Home/Self Care    Network Utilization Review Department  ATTENTION: Please call with any questions or concerns to [phone] and carefully listen to the prompts so that you are directed to the right person. All voicemails are confidential.   For Discharge needs, contact Care Management DC Support Team at [phone] opt. 2  Send all requests for admission clinical reviews, approved or denied determinations and any other requests to dedicated fax number below belonging to the campus where the patient is receiving treatment. List of dedicated fax numbers for the Facilities:  FACILITY NAME UR FAX NUMBER   ADMISSION DENIALS (Administrative/Medical Necessity) [phone]   DISCHARGE SUPPORT TEAM ([facility]) [phone]   PARENT CHILD HEALTH (Maternity/NICU/Pediatrics) [phone]   [facility] [phone]   [facility] [phone]   [facility] [phone]   [facility] [phone]   [facility] [phone]   [facility] [phone]   [facility] [phone]   [facility] [phone]   [facility] [phone]   [facility] [phone]   [facility] [phone]   [facility] [phone]

## 2024-05-08 ENCOUNTER — TELEPHONE (OUTPATIENT)
Dept: INFECTIOUS DISEASES | Facility: CLINIC | Age: 24
End: 2024-05-08

## 2024-05-08 LAB
BACTERIA BLD CULT: NORMAL
BACTERIA BLD CULT: NORMAL

## 2024-05-08 NOTE — TELEPHONE ENCOUNTER
Called and spoke with patient today.   Reminded patient to have labs done 7-10 days after discharge. Informed patient no follow up appointment needed.   Informed patient if there are any further questions or concerns to call the office.   Patient verbalizes understanding at this time.

## 2024-05-13 ENCOUNTER — APPOINTMENT (OUTPATIENT)
Dept: LAB | Facility: HOSPITAL | Age: 24
End: 2024-05-13
Payer: COMMERCIAL

## 2024-05-13 DIAGNOSIS — R74.01 TRANSAMINITIS: ICD-10-CM

## 2024-05-13 DIAGNOSIS — B27.00 INFECTIOUS MONONUCLEOSIS DUE TO EPSTEIN-BARR VIRUS (EBV): ICD-10-CM

## 2024-05-13 LAB
ALBUMIN SERPL BCP-MCNC: 4.2 G/DL (ref 3.5–5)
ALP SERPL-CCNC: 173 U/L (ref 34–104)
ALT SERPL W P-5'-P-CCNC: 94 U/L (ref 7–52)
ANION GAP SERPL CALCULATED.3IONS-SCNC: 6 MMOL/L (ref 4–13)
AST SERPL W P-5'-P-CCNC: 27 U/L (ref 13–39)
BASOPHILS # BLD MANUAL: 0 THOUSAND/UL (ref 0–0.1)
BASOPHILS NFR MAR MANUAL: 0 % (ref 0–1)
BILIRUB DIRECT SERPL-MCNC: 0.46 MG/DL (ref 0–0.2)
BILIRUB SERPL-MCNC: 1.5 MG/DL (ref 0.2–1)
BUN SERPL-MCNC: 17 MG/DL (ref 5–25)
CALCIUM SERPL-MCNC: 9.3 MG/DL (ref 8.4–10.2)
CHLORIDE SERPL-SCNC: 99 MMOL/L (ref 96–108)
CO2 SERPL-SCNC: 28 MMOL/L (ref 21–32)
CREAT SERPL-MCNC: 1.05 MG/DL (ref 0.6–1.3)
EOSINOPHIL # BLD MANUAL: 0 THOUSAND/UL (ref 0–0.4)
EOSINOPHIL NFR BLD MANUAL: 0 % (ref 0–6)
ERYTHROCYTE [DISTWIDTH] IN BLOOD BY AUTOMATED COUNT: 13.6 % (ref 11.6–15.1)
GFR SERPL CREATININE-BSD FRML MDRD: 100 ML/MIN/1.73SQ M
GLUCOSE SERPL-MCNC: 83 MG/DL (ref 65–140)
HCT VFR BLD AUTO: 45 % (ref 36.5–49.3)
HGB BLD-MCNC: 14.7 G/DL (ref 12–17)
LYMPHOCYTES # BLD AUTO: 5.06 THOUSAND/UL (ref 0.6–4.47)
LYMPHOCYTES # BLD AUTO: 56 % (ref 14–44)
MCH RBC QN AUTO: 28.2 PG (ref 26.8–34.3)
MCHC RBC AUTO-ENTMCNC: 32.7 G/DL (ref 31.4–37.4)
MCV RBC AUTO: 86 FL (ref 82–98)
MONOCYTES # BLD AUTO: 0.35 THOUSAND/UL (ref 0–1.22)
MONOCYTES NFR BLD: 4 % (ref 4–12)
NEUTROPHILS # BLD MANUAL: 3.32 THOUSAND/UL (ref 1.85–7.62)
NEUTS SEG NFR BLD AUTO: 38 % (ref 43–75)
PLATELET # BLD AUTO: 384 THOUSANDS/UL (ref 149–390)
PLATELET BLD QL SMEAR: ADEQUATE
PMV BLD AUTO: 10.7 FL (ref 8.9–12.7)
POTASSIUM SERPL-SCNC: 4.1 MMOL/L (ref 3.5–5.3)
PROT SERPL-MCNC: 8.2 G/DL (ref 6.4–8.4)
RBC # BLD AUTO: 5.22 MILLION/UL (ref 3.88–5.62)
RBC MORPH BLD: NORMAL
SODIUM SERPL-SCNC: 133 MMOL/L (ref 135–147)
VARIANT LYMPHS # BLD AUTO: 2 %
WBC # BLD AUTO: 8.73 THOUSAND/UL (ref 4.31–10.16)

## 2024-05-13 PROCEDURE — 82248 BILIRUBIN DIRECT: CPT

## 2024-05-13 PROCEDURE — 36415 COLL VENOUS BLD VENIPUNCTURE: CPT

## 2024-05-13 PROCEDURE — 80053 COMPREHEN METABOLIC PANEL: CPT

## 2024-05-13 PROCEDURE — 85007 BL SMEAR W/DIFF WBC COUNT: CPT

## 2024-05-13 PROCEDURE — 85027 COMPLETE CBC AUTOMATED: CPT

## 2024-05-14 LAB
A1AT PHENOTYP SERPL IFE: NORMAL
A1AT SERPL-MCNC: 121 MG/DL (ref 95–164)

## 2024-07-11 DIAGNOSIS — Z00.6 ENCOUNTER FOR EXAMINATION FOR NORMAL COMPARISON OR CONTROL IN CLINICAL RESEARCH PROGRAM: ICD-10-CM

## 2024-07-29 ENCOUNTER — OFFICE VISIT (OUTPATIENT)
Dept: GASTROENTEROLOGY | Facility: CLINIC | Age: 24
End: 2024-07-29
Payer: COMMERCIAL

## 2024-07-29 ENCOUNTER — APPOINTMENT (OUTPATIENT)
Dept: LAB | Facility: HOSPITAL | Age: 24
End: 2024-07-29
Payer: COMMERCIAL

## 2024-07-29 VITALS
WEIGHT: 193.2 LBS | HEIGHT: 69 IN | DIASTOLIC BLOOD PRESSURE: 72 MMHG | SYSTOLIC BLOOD PRESSURE: 116 MMHG | TEMPERATURE: 97.7 F | BODY MASS INDEX: 28.61 KG/M2

## 2024-07-29 DIAGNOSIS — Z00.6 ENCOUNTER FOR EXAMINATION FOR NORMAL COMPARISON OR CONTROL IN CLINICAL RESEARCH PROGRAM: ICD-10-CM

## 2024-07-29 DIAGNOSIS — R17 JAUNDICE: ICD-10-CM

## 2024-07-29 DIAGNOSIS — R21 RASH: ICD-10-CM

## 2024-07-29 DIAGNOSIS — R16.0 LIVER MASS, LEFT LOBE: ICD-10-CM

## 2024-07-29 DIAGNOSIS — R74.01 TRANSAMINITIS: ICD-10-CM

## 2024-07-29 DIAGNOSIS — B27.09 GAMMAHERPESVIRAL MONONUCLEOSIS WITH OTHER COMPLICATIONS: ICD-10-CM

## 2024-07-29 LAB
ALBUMIN SERPL BCG-MCNC: 4.7 G/DL (ref 3.5–5)
ALP SERPL-CCNC: 63 U/L (ref 34–104)
ALT SERPL W P-5'-P-CCNC: 43 U/L (ref 7–52)
ANION GAP SERPL CALCULATED.3IONS-SCNC: 3 MMOL/L (ref 4–13)
AST SERPL W P-5'-P-CCNC: 23 U/L (ref 13–39)
BILIRUB SERPL-MCNC: 0.84 MG/DL (ref 0.2–1)
BUN SERPL-MCNC: 13 MG/DL (ref 5–25)
CALCIUM SERPL-MCNC: 9.6 MG/DL (ref 8.4–10.2)
CHLORIDE SERPL-SCNC: 104 MMOL/L (ref 96–108)
CO2 SERPL-SCNC: 31 MMOL/L (ref 21–32)
CREAT SERPL-MCNC: 1 MG/DL (ref 0.6–1.3)
GFR SERPL CREATININE-BSD FRML MDRD: 107 ML/MIN/1.73SQ M
GLUCOSE P FAST SERPL-MCNC: 96 MG/DL (ref 65–99)
HAV AB SER QL IA: REACTIVE
HBV SURFACE AB SER-ACNC: 3.29 MIU/ML
POTASSIUM SERPL-SCNC: 4.3 MMOL/L (ref 3.5–5.3)
PROT SERPL-MCNC: 7.9 G/DL (ref 6.4–8.4)
SODIUM SERPL-SCNC: 138 MMOL/L (ref 135–147)

## 2024-07-29 PROCEDURE — 80053 COMPREHEN METABOLIC PANEL: CPT

## 2024-07-29 PROCEDURE — 86706 HEP B SURFACE ANTIBODY: CPT

## 2024-07-29 PROCEDURE — 87529 HSV DNA AMP PROBE: CPT | Performed by: STUDENT IN AN ORGANIZED HEALTH CARE EDUCATION/TRAINING PROGRAM

## 2024-07-29 PROCEDURE — 86708 HEPATITIS A ANTIBODY: CPT

## 2024-07-29 PROCEDURE — 36415 COLL VENOUS BLD VENIPUNCTURE: CPT

## 2024-07-29 PROCEDURE — 99213 OFFICE O/P EST LOW 20 MIN: CPT | Performed by: INTERNAL MEDICINE

## 2024-07-29 NOTE — PATIENT INSTRUCTIONS
Blood work at your earliest convenience.  If you need hepatitis A and B vaccination, you can get it done at your preferred pharmacy.

## 2024-07-29 NOTE — PROGRESS NOTES
[facility] - Outpatient Follow-up Note  Jhonatan Whitney 23 y.o. male MRN: [id number]  Encounter: [id number]          ASSESSMENT AND PLAN:      #1: Elevated LFTs  Blood work done 1 week after discharge showed significant improvement but not normalization.  AST normalized, total bilirubin down to 1.5.  Likely related to infectious mononucleosis.  Other underlying cause of chronic liver disease ruled out however hepatitis B core IgM was positive which I believe was a false positive in the setting of acute viral infection.  Hepatitis B DNA and surface antigen was negative.  Will plan for repeat LFTs.  If normal no further evaluation required.  If LFTs do not normalize but continue to trend down, we will repeat blood work again in 1 to 2 months.    Segment 3 lesion measuring 3.8 x 4.2 x 4.1 cm:  Enhancement characteristics consistent with focal nodular hyperplasia.  This is a benign entity and requires no ongoing serial imaging.    Hepatitis a and B immunity status:  Hepatitis A antibody total and hepatitis B surface antibody quant requested.  If blood work shows he is not immune, I will call in a prescription to his preferred pharmacy.      FOLLOW-UP:  No follow-ups on file.     VISIT DIAGNOSES AND ORDERS:      1. Liver mass, left lobe    2. Gammaherpesviral mononucleosis with other complications    3. Transaminitis    4. Jaundice    5. Rash      Orders Placed This Encounter   Procedures    Comprehensive metabolic panel    Hepatitis A antibody, total    Hepatitis B surface antibody     ______________________________________________________________________    SUBJECTIVE:         Mr. Jhonatan Whitney is a 23 y.o. male with medical history as outlined below, who was seen during her recent hospitalization by my colleagues after being found to have elevated LFTs and a liver mass measuring 4.6 cm.  Inpatient evaluation revealed he had EBV infection.  Imaging showed a benign FNH.  After  "stabilization, he was discharged with plans for serially following labs and follow-up.    Unfortunately, his last set of blood work was on May 13, 2024 with improvement of transaminases but not normalization.  His ALT was 94 and total bilirubin was 1.5 at that time.    In the office today, he states he feels well without any ongoing symptoms.  Mr. Whitney denies recent or history of yellow eyes/skin, dark urine, GI bleeding, abdominal distention with fluid, lower extremity swelling, easy bruising, excessive bleeding, pruritus or confusion.  he denies abdominal pain, nausea, vomiting, heartburn, reflux, difficulty swallowing, early satiety, bloating, diarrhea, constipation or straining with passing stools.    He denies excessive alcohol use.         REVIEW OF SYSTEMS     Review of Systems per HPI    Historical Information   Patient Active Problem List   Diagnosis    Elevated LFTs    Rash    SIRS (systemic inflammatory response syndrome) (HCC)    Liver mass, left lobe    Infectious mononucleosis due to Ramiro-Barr virus (EBV)     Social History     Substance and Sexual Activity   Alcohol Use Yes    Alcohol/week: 1.0 - 2.0 standard drink of alcohol    Types: 1 - 2 Standard drinks or equivalent per week     Social History     Substance and Sexual Activity   Drug Use Never     Social History     Tobacco Use   Smoking Status Never   Smokeless Tobacco Never       Meds/Allergies     No current outpatient medications on file.    No Known Allergies        Objective     Blood pressure 116/72, temperature 97.7 °F (36.5 °C), temperature source Tympanic, height 5' 9\" (1.753 m), weight 87.6 kg (193 lb 3.2 oz). Body mass index is 28.53 kg/m².      PHYSICAL EXAM:      Physical Exam  Vitals reviewed.   Constitutional:       General: He is not in acute distress.     Appearance: Normal appearance. He is not ill-appearing.   HENT:      Head: Normocephalic and atraumatic.      Nose: Nose normal.      Mouth/Throat:      Pharynx: " "Oropharynx is clear. No posterior oropharyngeal erythema.   Eyes:      General: No scleral icterus.     Extraocular Movements: Extraocular movements intact.   Cardiovascular:      Rate and Rhythm: Normal rate and regular rhythm.      Heart sounds: No murmur heard.  Pulmonary:      Effort: Pulmonary effort is normal. No respiratory distress.      Breath sounds: Normal breath sounds. No rales.   Abdominal:      General: There is no distension.      Palpations: Abdomen is soft. There is no shifting dullness, fluid wave, hepatomegaly or splenomegaly.      Tenderness: There is no abdominal tenderness. There is no guarding.   Musculoskeletal:         General: No swelling. Normal range of motion.      Cervical back: Normal range of motion and neck supple.   Skin:     General: Skin is warm.      Coloration: Skin is not jaundiced.   Neurological:      General: No focal deficit present.      Mental Status: He is oriented to person, place, and time.   Psychiatric:         Mood and Affect: Mood normal.         Lab Results:   Lab Results   Component Value Date    K 4.1 05/13/2024    CO2 28 05/13/2024    CL 99 05/13/2024    BUN 17 05/13/2024    CREATININE 1.05 05/13/2024     Lab Results   Component Value Date    WBC 8.73 05/13/2024    HGB 14.7 05/13/2024    HCT 45.0 05/13/2024    MCV 86 05/13/2024     05/13/2024     Lab Results   Component Value Date    TP 8.2 05/13/2024    AST 27 05/13/2024    ALT 94 (H) 05/13/2024    BILIDIR 0.46 (H) 05/13/2024    INR 1.09 05/04/2024      Lab Results   Component Value Date    IRON 102 05/04/2024    FERRITIN 331 05/04/2024     No results found for: \"CHOL\", \"HDL\", \"TRIG\", \"LDLCAL\", \"LDL\"      Radiology Results:   No results found.      Abraham Dominguez MD  "

## 2024-08-01 LAB
HSV1 DNA SPEC QL NAA+PROBE: NEGATIVE
HSV2 DNA SPEC QL NAA+PROBE: NEGATIVE

## 2024-09-08 ENCOUNTER — AMB VIDEO VISIT (OUTPATIENT)
Dept: OTHER | Facility: HOSPITAL | Age: 24
End: 2024-09-08
Payer: COMMERCIAL

## 2024-09-08 ENCOUNTER — AMB VIDEO VISIT (OUTPATIENT)
Dept: OTHER | Facility: HOSPITAL | Age: 24
End: 2024-09-08

## 2024-09-08 DIAGNOSIS — J06.9 UPPER RESPIRATORY TRACT INFECTION, UNSPECIFIED TYPE: Primary | ICD-10-CM

## 2024-09-08 PROCEDURE — 99212 OFFICE O/P EST SF 10 MIN: CPT | Performed by: PHYSICIAN ASSISTANT

## 2024-09-08 NOTE — PATIENT INSTRUCTIONS
Continue supportive care including but not limited to Tylenol/Motrin as needed for pain and fever, daily allergy medication for sinus symptoms, nasal spray such as flonase, nasacort, of astepro 1-2 daily for sinus symptoms;  Take home COVID test, follow up with PCP in 2-3 days. Go to ER if worsening symptoms, development of shortness of breath, chest pain, dizziness, fainting, blurry vision. Fever or pain not relieved with tylenol/motrin

## 2024-09-08 NOTE — PROGRESS NOTES
"Required Documentation:  Encounter provider: MARIA DE JESUS BushC    Identify all parties in room with patient during virtual visit:  No one else    The patient was identified by name and date of birth. Jhonatan Whitney was informed that this is a telemedicine visit and that the visit is being conducted through the [employer] platform. He agrees to proceed..  My office door was closed. No one else was in the room.  He acknowledged consent and understanding of privacy and security of the video platform. The patient has agreed to participate and understands they can discontinue the visit at any time.    Verification of patient location:  Patient is located at Home in the following state in which I hold an active license [place]    Patient is aware this is a billable service.     Reason for visit is No chief complaint on file.       Assessment/Plan:    Diagnoses and all orders for this visit:    Upper respiratory tract infection, unspecified type      Patient Instructions   Continue supportive care including but not limited to Tylenol/Motrin as needed for pain and fever, daily allergy medication for sinus symptoms, nasal spray such as flonase, nasacort, of astepro 1-2 daily for sinus symptoms;  Take home COVID test, follow up with PCP in 2-3 days. Go to ER if worsening symptoms, development of shortness of breath, chest pain, dizziness, fainting, blurry vision. Fever or pain not relieved with tylenol/motrin  Subjective  23 y.o. male with 4 days of \"COVID\" like symptoms, notes nasal congestion, subjective fever, chills, sore throat and cough. States symptoms/feels like he did last time he had covid. Staes he feels symptoms are improving overall but needs note for classes. Denies N/V/D, SOB, CP, HA, blurry vision, dizziness, fainting.            No past medical history on file.    No past surgical history on file.     No Known Allergies    Review of Systems   Constitutional:  Positive for chills, fatigue and " fever.   HENT:  Positive for congestion, postnasal drip, rhinorrhea and sore throat.    Respiratory:  Positive for cough.    All other systems reviewed and are negative.      Video Exam    There were no vitals filed for this visit.    Physical Exam  Constitutional:       General: He is not in acute distress.     Appearance: Normal appearance. He is not ill-appearing or toxic-appearing.   HENT:      Head: Normocephalic and atraumatic.      Right Ear: External ear normal.      Left Ear: External ear normal.      Nose: Congestion and rhinorrhea present.      Mouth/Throat:      Pharynx: Posterior oropharyngeal erythema present.   Pulmonary:      Effort: Pulmonary effort is normal. No respiratory distress.      Breath sounds: No stridor. No wheezing.   Neurological:      Mental Status: He is alert and oriented to person, place, and time. Mental status is at baseline.   Psychiatric:         Mood and Affect: Mood normal.         Behavior: Behavior normal.         Thought Content: Thought content normal.         Judgment: Judgment normal.         Visit Time  Total Visit Duration: 6 minutes

## 2024-09-08 NOTE — LETTER
September 8, 2024     Patient: Jhonatan Whitney  YOB: 2000  Date of Visit: 9/8/2024      To Whom it May Concern:    Jhonatan Whitney is under my professional care. Jhonatan was seen in my office on 9/8/2024. Jhonatan may return to school on 09/11/24 .    If you have any questions or concerns, please don't hesitate to call.         Sincerely,          Brie Pérez PA-C        CC: No Recipients

## 2024-09-08 NOTE — CARE ANYWHERE EVISITS
Visit Summary for Jhonatan Whitney - Gender: Male - Date of Birth: 2000  Date: [id number] - Duration: 6 minutes  Patient: Jhonatan Whitney  Provider: Brie Pérez PA-C    Patient Contact Information  Address  [place]; [place] [address]  [phone]    Visit Topics  Covid Symptoms since last Wednesday  [Added By: Self - 2024-09-08]    Triage Questions   What is your current physical address in the event of a medical emergency? Answer []  Are you allergic to any medications? Answer []  Are you now or could you be pregnant? Answer []  Do you have any immune system compromise or chronic lung   disease? Answer []  Do you have any vulnerable family members in the home (infant, pregnant, cancer, elderly)? Answer []     Conversation Transcripts  [0A][0A] [Notification] You are connected with Brie Pérez PA-C, Urgent Care Specialist.[0A][Notification] Jhonatan Whitney is located in [place].[0A][Notification] Jhonatan Whitney has shared health history...[0A]    Diagnosis  Acute upper resp infections of multiple and unsp sites    Procedures  Value: [id number] Code: CPT-4 UNLISTED E&M SERVICE    Medications Prescribed    No prescriptions ordered    Electronically signed by: Brie Pérez PA-C(NPI [id number])